# Patient Record
Sex: MALE | Race: WHITE | NOT HISPANIC OR LATINO | Employment: OTHER | URBAN - METROPOLITAN AREA
[De-identification: names, ages, dates, MRNs, and addresses within clinical notes are randomized per-mention and may not be internally consistent; named-entity substitution may affect disease eponyms.]

---

## 2017-03-08 DIAGNOSIS — E11.9 TYPE 2 DIABETES MELLITUS WITHOUT COMPLICATIONS (H): ICD-10-CM

## 2017-03-08 NOTE — TELEPHONE ENCOUNTER
metFORMIN (GLUCOPHAGE) 500 MG tablet         Last Written Prescription Date: 1/2/2017  Last Fill Quantity: 180, # refills: 0  [place] [facility] or [facility] prescribing provider:  9/29/2016        BP Readings from Last 3 Encounters:   09/29/16 138/72   05/26/16 145/66   04/21/16 127/66     Lab Results   Component Value Date    MICROL 6 03/18/2016     No results found for: MICROALBUMIN  Creatinine   Date Value Ref Range Status   09/29/2016 1.01 0.66 - 1.25 mg/dL Final   ]  GFR Estimate   Date Value Ref Range Status   09/29/2016 72 >60 mL/min/1.7m2 Final     Comment:     Non  GFR Calc   04/21/2016 66 >60 mL/min/1.7m2 Final     Comment:     Non  GFR Calc   03/18/2016 68 >60 mL/min/1.7m2 Final     Comment:     Non  GFR Calc     GFR Estimate If Black   Date Value Ref Range Status   09/29/2016 87 >60 mL/min/1.7m2 Final     Comment:      GFR Calc   04/21/2016 80 >60 mL/min/1.7m2 Final     Comment:      GFR Calc   03/18/2016 82 >60 mL/min/1.7m2 Final     Comment:      GFR Calc     Lab Results   Component Value Date    CHOL 146 03/18/2016     Lab Results   Component Value Date    HDL 42 03/18/2016     Lab Results   Component Value Date    LDL 85 03/18/2016     Lab Results   Component Value Date    TRIG 97 03/18/2016     Lab Results   Component Value Date    CHOLHDLRATIO 2.4 03/16/2015     Lab Results   Component Value Date    AST 16 04/21/2016     Lab Results   Component Value Date    ALT 31 04/21/2016     Lab Results   Component Value Date    A1C 5.9 09/29/2016    A1C 6.1 03/18/2016    A1C 6.2 09/28/2015    A1C 6.4 03/16/2015    A1C 6.0 10/06/2014     Potassium   Date Value Ref Range Status   09/29/2016 4.3 3.4 - 5.3 mmol/L Final

## 2017-03-09 NOTE — TELEPHONE ENCOUNTER
Due for CPE per last OV note  Spoke with patient.  Chhaya scheduled.  Request 30 days supply for coverage until appt time  30 days refilled to [facility] pharmacy as requested    Next 5 appointments (look out 90 days)     Mar 17, 2017  9:20 AM CDT   PHYSICAL with Hamilton Romo MD   [facility] ([facility]    [address] [place] [id number]   [phone]                Che Berg RN

## 2017-03-11 DIAGNOSIS — E11.9 TYPE 2 DIABETES MELLITUS WITHOUT COMPLICATIONS (H): ICD-10-CM

## 2017-03-11 DIAGNOSIS — E78.2 MIXED HYPERLIPIDEMIA: ICD-10-CM

## 2017-03-13 RX ORDER — PRAVASTATIN SODIUM 20 MG
TABLET ORAL
Qty: 15 TABLET | Refills: 0 | Status: SHIPPED | OUTPATIENT
Start: 2017-03-13 | End: 2017-03-17

## 2017-03-13 NOTE — TELEPHONE ENCOUNTER
pravastatin     Last Written Prescription Date: 10/4/16  Last Fill Quantity: 90, # refills: 1  Last Office Visit with [facility], [facility] or [facility] prescribing provider: 9/29/16  Next 5 appointments (look out 90 days)     Mar 17, 2017  9:20 AM CDT   PHYSICAL with Hamilton Romo MD   [facility]    [address] [place] [id number] [phone]                   Lab Results   Component Value Date    CHOL 146 03/18/2016     Lab Results   Component Value Date    HDL 42 03/18/2016     Lab Results   Component Value Date    LDL 85 03/18/2016     Lab Results   Component Value Date    TRIG 97 03/18/2016     Lab Results   Component Value Date    CHOLHDLRATIO 2.4 03/16/2015     Medication is being filled for 1 time refill only due to:  Patient needs to be seen because FLP every 12 months..     Stephy Dozier RN   [facility] - Triage

## 2017-03-16 ENCOUNTER — TRANSFERRED RECORDS (OUTPATIENT)
Dept: HEALTH INFORMATION MANAGEMENT | Facility: CLINIC | Age: 77
End: 2017-03-16

## 2017-03-17 ENCOUNTER — OFFICE VISIT (OUTPATIENT)
Dept: FAMILY MEDICINE | Facility: CLINIC | Age: 77
End: 2017-03-17
Payer: MEDICARE

## 2017-03-17 ENCOUNTER — MYC MEDICAL ADVICE (OUTPATIENT)
Dept: FAMILY MEDICINE | Facility: CLINIC | Age: 77
End: 2017-03-17

## 2017-03-17 VITALS
DIASTOLIC BLOOD PRESSURE: 64 MMHG | HEIGHT: 67 IN | BODY MASS INDEX: 34.06 KG/M2 | OXYGEN SATURATION: 97 % | HEART RATE: 68 BPM | TEMPERATURE: 97.5 F | SYSTOLIC BLOOD PRESSURE: 144 MMHG | WEIGHT: 217 LBS

## 2017-03-17 DIAGNOSIS — Z12.5 SCREENING FOR PROSTATE CANCER: ICD-10-CM

## 2017-03-17 DIAGNOSIS — R80.9 MICROALBUMINURIA: ICD-10-CM

## 2017-03-17 DIAGNOSIS — N40.0 BENIGN NON-NODULAR PROSTATIC HYPERPLASIA WITHOUT LOWER URINARY TRACT SYMPTOMS: ICD-10-CM

## 2017-03-17 DIAGNOSIS — E11.9 TYPE 2 DIABETES MELLITUS WITHOUT COMPLICATION, UNSPECIFIED LONG TERM INSULIN USE STATUS: ICD-10-CM

## 2017-03-17 DIAGNOSIS — E78.2 MIXED HYPERLIPIDEMIA: ICD-10-CM

## 2017-03-17 DIAGNOSIS — Z00.00 HEALTH MAINTENANCE EXAMINATION: Primary | ICD-10-CM

## 2017-03-17 LAB
ALT SERPL W P-5'-P-CCNC: 42 U/L (ref 0–70)
ANION GAP SERPL CALCULATED.3IONS-SCNC: 11 MMOL/L (ref 3–14)
BUN SERPL-MCNC: 27 MG/DL (ref 7–30)
CALCIUM SERPL-MCNC: 9.1 MG/DL (ref 8.5–10.1)
CHLORIDE SERPL-SCNC: 104 MMOL/L (ref 94–109)
CHOLEST SERPL-MCNC: 148 MG/DL
CO2 SERPL-SCNC: 23 MMOL/L (ref 20–32)
CREAT SERPL-MCNC: 1.07 MG/DL (ref 0.66–1.25)
CREAT UR-MCNC: 100 MG/DL
ERYTHROCYTE [DISTWIDTH] IN BLOOD BY AUTOMATED COUNT: 13.7 % (ref 10–15)
GFR SERPL CREATININE-BSD FRML MDRD: 67 ML/MIN/1.7M2
GLUCOSE SERPL-MCNC: 113 MG/DL (ref 70–99)
HBA1C MFR BLD: 6.3 % (ref 4.3–6)
HCT VFR BLD AUTO: 40.2 % (ref 40–53)
HDLC SERPL-MCNC: 42 MG/DL
HGB BLD-MCNC: 13.3 G/DL (ref 13.3–17.7)
LDLC SERPL CALC-MCNC: 78 MG/DL
MCH RBC QN AUTO: 29 PG (ref 26.5–33)
MCHC RBC AUTO-ENTMCNC: 33.1 G/DL (ref 31.5–36.5)
MCV RBC AUTO: 88 FL (ref 78–100)
MICROALBUMIN UR-MCNC: 22 MG/L
MICROALBUMIN/CREAT UR: 21.8 MG/G CR (ref 0–17)
NONHDLC SERPL-MCNC: 106 MG/DL
PLATELET # BLD AUTO: 203 10E9/L (ref 150–450)
POTASSIUM SERPL-SCNC: 4.5 MMOL/L (ref 3.4–5.3)
PSA SERPL-ACNC: 0.7 UG/L (ref 0–4)
RBC # BLD AUTO: 4.58 10E12/L (ref 4.4–5.9)
SODIUM SERPL-SCNC: 138 MMOL/L (ref 133–144)
TRIGL SERPL-MCNC: 141 MG/DL
WBC # BLD AUTO: 6.5 10E9/L (ref 4–11)

## 2017-03-17 PROCEDURE — 82043 UR ALBUMIN QUANTITATIVE: CPT | Performed by: FAMILY MEDICINE

## 2017-03-17 PROCEDURE — G0103 PSA SCREENING: HCPCS | Performed by: FAMILY MEDICINE

## 2017-03-17 PROCEDURE — 84460 ALANINE AMINO (ALT) (SGPT): CPT | Performed by: FAMILY MEDICINE

## 2017-03-17 PROCEDURE — 80061 LIPID PANEL: CPT | Performed by: FAMILY MEDICINE

## 2017-03-17 PROCEDURE — 36415 COLL VENOUS BLD VENIPUNCTURE: CPT | Performed by: FAMILY MEDICINE

## 2017-03-17 PROCEDURE — 83036 HEMOGLOBIN GLYCOSYLATED A1C: CPT | Performed by: FAMILY MEDICINE

## 2017-03-17 PROCEDURE — 85027 COMPLETE CBC AUTOMATED: CPT | Performed by: FAMILY MEDICINE

## 2017-03-17 PROCEDURE — G0438 PPPS, INITIAL VISIT: HCPCS | Performed by: FAMILY MEDICINE

## 2017-03-17 PROCEDURE — 80048 BASIC METABOLIC PNL TOTAL CA: CPT | Performed by: FAMILY MEDICINE

## 2017-03-17 RX ORDER — TERAZOSIN 5 MG/1
CAPSULE ORAL
Qty: 90 CAPSULE | Refills: 1 | Status: SHIPPED | OUTPATIENT
Start: 2017-03-17 | End: 2017-10-11

## 2017-03-17 RX ORDER — PRAVASTATIN SODIUM 20 MG
TABLET ORAL
Qty: 90 TABLET | Refills: 1 | Status: SHIPPED | OUTPATIENT
Start: 2017-03-17 | End: 2017-10-11

## 2017-03-17 RX ORDER — LISINOPRIL 5 MG/1
5 TABLET ORAL DAILY
Qty: 90 TABLET | Refills: 1 | Status: SHIPPED | OUTPATIENT
Start: 2017-03-17 | End: 2017-10-11

## 2017-03-17 NOTE — NURSING NOTE
"Chief Complaint   Patient presents with     Physical     is fasting       Initial /64  Pulse 68  Temp 97.5  F (36.4  C) (Tympanic)  Ht 5' 7\" (1.702 m)  Wt 217 lb (98.4 kg)  SpO2 97%  BMI 33.99 kg/m2 Estimated body mass index is 33.99 kg/(m^2) as calculated from the following:    Height as of this encounter: 5' 7\" (1.702 m).    Weight as of this encounter: 217 lb (98.4 kg).  Medication Reconciliation: complete     Suzie Lomax CMA      "

## 2017-03-17 NOTE — PROGRESS NOTES
SUBJECTIVE:                                                            Vikash Arredondo II is a 76 year old male who presents for Preventive Visit.      Are you in the first 12 months of your Medicare Part B coverage?  No    Healthy Habits:    Do you get at least three servings of calcium containing foods daily (dairy, green leafy vegetables, etc.)? yes    Amount of exercise or daily activities, outside of work: 2-3 day(s) per week    Problems taking medications regularly No    Medication side effects: No    Have you had an eye exam in the past two years? yes    Do you see a dentist twice per year? yes    Do you have sleep apnea, excessive snoring or daytime drowsiness?no    COGNITIVE SCREEN  1) Repeat 3 items (Banana, Sunrise, Chair)    2) Clock draw: NORMAL  3) 3 item recall: Recalls 3 objects  Results: NORMAL clock, 1-2 items recalled: COGNITIVE IMPAIRMENT LESS LIKELY    Mini-CogTM Copyright S Minna. Licensed by the author for use in [facility]; reprinted with permission (cooper@[facility]). All rights reserved.                Reviewed and updated as needed this visit by clinical staff  Tobacco  Allergies  Meds  Med Hx  Surg Hx  Fam Hx  Soc Hx        Reviewed and updated as needed this visit by Provider        Social History   Substance Use Topics     Smoking status: Former Smoker     Packs/day: 0.50     Years: 1.00     Types: Cigarettes     Quit date: 1/2/1961     Smokeless tobacco: Never Used     Alcohol use No       The patient does not drink >3 drinks per day nor >7 drinks per week.    Today's PHQ-2 Score:   PHQ-2 ( 1999 [employer]) 3/17/2017 9/29/2016   Q1: Little interest or pleasure in doing things 0 0   Q2: Feeling down, depressed or hopeless 0 0   PHQ-2 Score 0 0   Little interest or pleasure in doing things - -   Feeling down, depressed or hopeless - -   PHQ-2 Score - -       Do you feel safe in your environment - Yes    Do you have a Health Care Directive?: Yes: Advance Directive has  been received and scanned.    Current providers sharing in care for this patient include:   Patient Care Team:  Hamilton Romo MD as PCP - General (Family Practice)      Hearing impairment: No    Ability to successfully perform activities of daily living: Yes, no assistance needed     Fall risk:  Fallen 2 or more times in the past year?: No  Any fall with injury in the past year?: No    Home safety:  none identified      The following health maintenance items are reviewed in Epic and correct as of today:  Health Maintenance   Topic Date Due     MEDICARE ANNUAL WELLNESS VISIT  11/24/2005     FOOT EXAM Q1 YEAR( NO INBASKET)  03/13/2015     TSH W/ FREE T4 REFLEX Q2 YEAR (NO INBASKET)  03/18/2015     ADVANCE DIRECTIVE PLANNING Q5 YRS (NO INBASKET)  11/10/2016     LIPID MONITORING Q1 YEAR( NO INBASKET)  03/18/2017     MICROALBUMIN Q1 YEAR( NO INBASKET)  03/18/2017     EYE EXAM Q1 YEAR( NO INBASKET)  03/19/2017     A1C Q6 MO( NO INBASKET)  03/29/2017     INFLUENZA VACCINE (SYSTEM ASSIGNED)  09/01/2017     CREATININE Q1 YEAR (NO INBASKET)  09/29/2017     FALL RISK ASSESSMENT  09/29/2017     TETANUS IMMUNIZATION (SYSTEM ASSIGNED)  09/11/2022     COLONOSCOPY Q10 YR INBASKET MESSAGE  05/19/2026     PNEUMOCOCCAL  Completed         Pneumonia Vaccine:Adults age 65+ who received Pneumovax (PPSV23) at 65 years or older: Should be given PCV13 > 1 year after their most recent PPSV23     ROS:  Constitutional, HEENT, cardiovascular, pulmonary, gi and gu systems are negative, except as otherwise noted.    BP Readings from Last 3 Encounters:   03/17/17 144/64   09/29/16 138/72   05/26/16 145/66    Wt Readings from Last 3 Encounters:   03/17/17 217 lb (98.4 kg)   09/29/16 209 lb (94.8 kg)   05/26/16 213 lb 6.4 oz (96.8 kg)                  Patient Active Problem List   Diagnosis     BPH (benign prostatic hyperplasia)     CARDIOVASCULAR SCREENING; LDL GOAL LESS THAN 100     Advanced directives, counseling/discussion     Obesity (BMI  30.0-34.9)     Type 2 diabetes mellitus without complications (H)     Normocytic normochromic anemia     Past Surgical History   Procedure Laterality Date     C laminotomy,lumbar disk,1 intrsp  1999     Hc repair of nasal septum  2002     Hc vasectomy unilat/bilat w postop semen  1974     Vasectomy     Hc repair incisional hernia,reducible  1986     Hernia Repair, Incisional, Unilateral     Colonoscopy  2/2002     Normal     Colonoscopy  8/11/07     Diverticulosis; repeat in 10 years     Hc repair rotator cuff,acute  8/2008     left shoulder     Extracapsular cataract extration with intraocular lens implant  7/2010     right eye     Extracapsular cataract extration with intraocular lens implant  11/2010     left eye     Laparoscopic herniorrhaphy ventral  1/23/2013     Procedure: LAPAROSCOPIC HERNIORRHAPHY VENTRAL;  LAPAROSCOPIC VENTRAL  RIGHT HERNIA REPAIR WITH MESH;  Surgeon: Jeanmarie Hutchison MD;  [facility]       Social History   Substance Use Topics     Smoking status: Former Smoker     Packs/day: 0.50     Years: 1.00     Types: Cigarettes     Quit date: 1/2/1961     Smokeless tobacco: Never Used     Alcohol use No     Family History   Problem Relation Age of Onset     Cancer - colorectal Paternal Uncle      C.A.D. Paternal Uncle      Anesthesia Reaction No family hx of      Blood Disease No family hx of      Thyroid Disease No family hx of      Eye Disorder No family hx of      Hypertension No family hx of          Current Outpatient Prescriptions   Medication Sig Dispense Refill     metFORMIN (GLUCOPHAGE) 500 MG tablet Take 1 tablet (500 mg) by mouth 3 times daily (with meals) . Need appointment for further refill 270 tablet 1     pravastatin (PRAVACHOL) 20 MG tablet TAKE 0.5 TABLET BY MOUTH DAILY 90 tablet 1     terazosin (HYTRIN) 5 MG capsule TAKE ONE CAPSULE BY MOUTH NIGHTLY AT BEDTIME 90 capsule 1     lisinopril (PRINIVIL/ZESTRIL) 5 MG tablet Take 1 tablet (5 mg) by mouth daily 90 tablet 1      "Calcium Carbonate-Vitamin D (CALCIUM + D PO) Take  by mouth.       ASPIRIN 81 MG OR TABS 1 tab po QD (Once per day)       MULTIVITAMIN TABS   OR 1 DAILY       [DISCONTINUED] pravastatin (PRAVACHOL) 20 MG tablet TAKE 1/2 TABLET (10 MG) BY MOUTH DAILY (Patient not taking: Reported on 3/17/2017) 15 tablet 0     [DISCONTINUED] metFORMIN (GLUCOPHAGE) 500 MG tablet Take 1 tablet (500 mg) by mouth 2 times daily (with meals) . Need appointment for further refill 60 tablet 0     [DISCONTINUED] pravastatin (PRAVACHOL) 20 MG tablet TAKE 0.5 TABLET BY MOUTH DAILY 90 tablet 1     [DISCONTINUED] terazosin (HYTRIN) 5 MG capsule TAKE ONE CAPSULE BY MOUTH NIGHTLY AT BEDTIME 90 capsule 1     [DISCONTINUED] lisinopril (PRINIVIL,ZESTRIL) 5 MG tablet Take 1 tablet (5 mg) by mouth daily 90 tablet 1     Cholecalciferol (VITAMIN D3) 3000 UNIT TABS Take  by mouth.       Allergies   Allergen Reactions     No Known Drug Allergies      Recent Labs   Lab Test  09/29/16   0939  04/21/16   1107  03/18/16   1004  09/28/15   0858  03/16/15   1112  10/06/14   0833   03/18/13   1035   03/10/11   1209   A1C  5.9   --   6.1*  6.2*  6.4*  6.0   < >  5.5   < >  5.7   LDL   --    --   85   --   52  67   < >  98   < >  87   HDL   --    --   42   --   48  36*   < >  40   < >  31*   TRIG   --    --   97   --   85  208*   < >  91   < >  111   ALT   --   31  32   --   41   --    --   28   < >  26   CR  1.01  1.09  1.06  1.09  1.04  1.12   < >  1.07   < >  1.03   GFRESTIMATED  72  66  68  66  70  64   < >  68   < >  71   GFRESTBLACK  87  80  82  80  84  77   < >  82   < >  86   POTASSIUM  4.3  4.2  4.4  4.2  3.9  4.7   --   4.8   < >  4.3   TSH   --    --    --    --    --    --    --   1.73   --   1.38    < > = values in this interval not displayed.      OBJECTIVE:                                                            /64  Pulse 68  Temp 97.5  F (36.4  C) (Tympanic)  Ht 5' 7\" (1.702 m)  Wt 217 lb (98.4 kg)  SpO2 97%  BMI 33.99 kg/m2 Estimated body " "mass index is 33.99 kg/(m^2) as calculated from the following:    Height as of this encounter: 5' 7\" (1.702 m).    Weight as of this encounter: 217 lb (98.4 kg).  EXAM:   GENERAL: healthy, alert and no distress  EYES: Eyes grossly normal to inspection, PERRL and conjunctivae and sclerae normal  HENT: ear canals and TM's normal, nose and mouth without ulcers or lesions  NECK: no adenopathy, no asymmetry, masses, or scars and thyroid normal to palpation  RESP: lungs clear to auscultation - no rales, rhonchi or wheezes  CV: regular rate and rhythm, normal S1 S2, no S3 or S4, no murmur, click or rub, no peripheral edema and peripheral pulses strong  ABDOMEN: soft, nontender, no hepatosplenomegaly, no masses and bowel sounds normal   (male): normal male genitalia without lesions or urethral discharge, no hernia  MS: no gross musculoskeletal defects noted, no edema  SKIN: no suspicious lesions or rashes  NEURO: Normal strength and tone, mentation intact and speech normal  BACK: no CVA tenderness, no paralumbar tenderness  PSYCH: mentation appears normal, affect normal/bright  LYMPH: no cervical, supraclavicular, axillary, or inguinal adenopathy    ASSESSMENT / PLAN:                                                            1. Health maintenance examination    - CBC with platelets  - Basic metabolic panel  (Ca, Cl, CO2, Creat, Gluc, K, Na, BUN)  - ALT  - Lipid Profile with reflex to direct LDL  - Hemoglobin A1c  - Albumin Random Urine Quantitative  - PSA, screen    2. Screening for prostate cancer    - PSA, screen    3. Type 2 diabetes mellitus without complication, unspecified long term insulin use status (H)    - Basic metabolic panel  (Ca, Cl, CO2, Creat, Gluc, K, Na, BUN)  - ALT  - Lipid Profile with reflex to direct LDL  - Hemoglobin A1c  - Albumin Random Urine Quantitative  - metFORMIN (GLUCOPHAGE) 500 MG tablet; Take 1 tablet (500 mg) by mouth 3 times daily (with meals) . Need appointment for further refill  " "Dispense: 270 tablet; Refill: 1  - pravastatin (PRAVACHOL) 20 MG tablet; TAKE 0.5 TABLET BY MOUTH DAILY  Dispense: 90 tablet; Refill: 1    4. Mixed hyperlipidemia    - pravastatin (PRAVACHOL) 20 MG tablet; TAKE 0.5 TABLET BY MOUTH DAILY  Dispense: 90 tablet; Refill: 1    5. Benign non-nodular prostatic hyperplasia without lower urinary tract symptoms    - terazosin (HYTRIN) 5 MG capsule; TAKE ONE CAPSULE BY MOUTH NIGHTLY AT BEDTIME  Dispense: 90 capsule; Refill: 1    6. Microalbuminuria    - lisinopril (PRINIVIL/ZESTRIL) 5 MG tablet; Take 1 tablet (5 mg) by mouth daily  Dispense: 90 tablet; Refill: 1    End of Life Planning:  Patient currently has an advanced directive: Yes.  Practitioner is supportive of decision.    COUNSELING:  Reviewed preventive health counseling, as reflected in patient instructions        Estimated body mass index is 33.99 kg/(m^2) as calculated from the following:    Height as of this encounter: 5' 7\" (1.702 m).    Weight as of this encounter: 217 lb (98.4 kg).     reports that he quit smoking about 56 years ago. His smoking use included Cigarettes. He has a 0.50 pack-year smoking history. He has never used smokeless tobacco.      Appropriate preventive services were discussed with this patient, including applicable screening as appropriate for cardiovascular disease, diabetes, osteopenia/osteoporosis, and glaucoma.  As appropriate for age/gender, discussed screening for colorectal cancer, prostate cancer, breast cancer, and cervical cancer. Checklist reviewing preventive services available has been given to the patient.    Reviewed patients plan of care and provided an AVS. The Basic Care Plan (routine screening as documented in Health Maintenance) for Vikash meets the Care Plan requirement. This Care Plan has been established and reviewed with the Patient.    Counseling Resources:  ATP IV Guidelines  Pooled Cohorts Equation Calculator  Breast Cancer Risk Calculator  FRAX Risk " Assessment  ICSI Preventive Guidelines  Dietary Guidelines for Americans, 2010  USDA's MyPlate  ASA Prophylaxis  Lung CA Screening    Hamilton Romo MD  [facility] [place]

## 2017-03-17 NOTE — MR AVS SNAPSHOT
After Visit Summary   3/17/2017    Vikash Arredondo II    MRN: [id number]           Patient Information     Date Of Birth          1940        Visit Information        Provider Department      3/17/2017 9:20 AM Hamilton Romo MD [facility]        Today's Diagnoses     Health maintenance examination    -  1    Screening for prostate cancer        Type 2 diabetes mellitus without complication, unspecified long term insulin use status (H)        Mixed hyperlipidemia        Benign non-nodular prostatic hyperplasia without lower urinary tract symptoms        Microalbuminuria          Care Instructions      Preventive Health Recommendations:   Male Ages 65 and over    Yearly exam:             See your health care provider every year in order to  o   Review health changes.   o   Discuss preventive care.    o   Review your medicines if your doctor has prescribed any.    Talk with your health care provider about whether you should have a test to screen for prostate cancer (PSA).    Every 3 years, have a diabetes test (fasting glucose). If you are at risk for diabetes, you should have this test more often.    Every 5 years, have a cholesterol test. Have this test more often if you are at risk for high cholesterol or heart disease.     Every 10 years, have a colonoscopy. Or, have a yearly FIT test (stool test). These exams will check for colon cancer.    Talk to with your health care provider about screening for Abdominal Aortic Aneurysm if you have a family history of AAA or have a history of smoking.    Shots:     Get a flu shot each year.     Get a tetanus shot every 10 years.     Talk to your doctor about your pneumonia vaccines. There are now two you should receive - Pneumovax (PPSV 23) and Prevnar (PCV 13).     Talk to your doctor about a shingles vaccine.     Talk to your doctor about the hepatitis B vaccine.  Nutrition:     Eat at least 5 servings of fruits and vegetables each  day.     Eat whole-grain bread, whole-wheat pasta and brown rice instead of white grains and rice.     Talk to your provider about Calcium and Vitamin D.   Lifestyle    Exercise for at least 150 minutes a week (30 minutes a day, 5 days a week). This will help you control your weight and prevent disease.     Limit alcohol to one drink per day.     No smoking.     Wear sunscreen to prevent skin cancer.     See your dentist every six months for an exam and cleaning.     See your eye doctor every 1 to 2 years to screen for conditions such as glaucoma, macular degeneration, cataracts, etc   Preventive Health Recommendations:   Male Ages 65 and over    Yearly exam:             See your health care provider every year in order to  o   Review health changes.   o   Discuss preventive care.    o   Review your medicines if your doctor has prescribed any.  Talk with your health care provider about whether you should have a test to screen for prostate cancer (PSA).  Every 3 years, have a diabetes test (fasting glucose). If you are at risk for diabetes, you should have this test more often.  Every 5 years, have a cholesterol test. Have this test more often if you are at risk for high cholesterol or heart disease.   Every 10 years, have a colonoscopy. Or, have a yearly FIT test (stool test). These exams will check for colon cancer.  Talk to with your health care provider about screening for Abdominal Aortic Aneurysm if you have a family history of AAA or have a history of smoking.    Shots:   Get a flu shot each year.   Get a tetanus shot every 10 years.   Talk to your doctor about your pneumonia vaccines. There are now two you should receive - Pneumovax (PPSV 23) and Prevnar (PCV 13).   Talk to your doctor about a shingles vaccine.   Talk to your doctor about the hepatitis B vaccine.  Nutrition:   Eat at least 5 servings of fruits and vegetables each day.   Eat whole-grain bread, whole-wheat pasta and brown rice instead of white  grains and rice.   Talk to your provider about Calcium and Vitamin D.   Lifestyle  Exercise for at least 150 minutes a week (30 minutes a day, 5 days a week). This will help you control your weight and prevent disease.   Limit alcohol to one drink per day.   No smoking.   Wear sunscreen to prevent skin cancer.   See your dentist every six months for an exam and cleaning.   See your eye doctor every 1 to 2 years to screen for conditions such as glaucoma, macular degeneration, cataracts, etc         Follow-ups after your visit        Who to contact     If you have questions or need follow up information about today's clinic visit or your schedule please contact [facility] [place] directly at [phone].  Normal or non-critical lab and imaging results will be communicated to you by BlackStratushart, letter or phone within 4 business days after the clinic has received the results. If you do not hear from us within 7 days, please contact the clinic through BlackStratushart or phone. If you have a critical or abnormal lab result, we will notify you by phone as soon as possible.  Submit refill requests through VIPerks or call your pharmacy and they will forward the refill request to us. Please allow 3 business days for your refill to be completed.          Additional Information About Your Visit        VIPerks Information     VIPerks gives you secure access to your electronic health record. If you see a primary care provider, you can also send messages to your care team and make appointments. If you have questions, please call your primary care clinic.  If you do not have a primary care provider, please call [phone] and they will assist you.        Care EveryWhere ID     This is your Care EveryWhere ID. This could be used by other organizations to access your [facility] medical records  [id number]        Your Vitals Were     Pulse Temperature Height Pulse Oximetry BMI (Body Mass Index)       68 97.5  F (36.4  C) (Tympanic)  "5' 7\" (1.702 m) 97% 33.99 kg/m2        Blood Pressure from Last 3 Encounters:   03/17/17 144/64   09/29/16 138/72   05/26/16 145/66    Weight from Last 3 Encounters:   03/17/17 217 lb (98.4 kg)   09/29/16 209 lb (94.8 kg)   05/26/16 213 lb 6.4 oz (96.8 kg)              We Performed the Following     Albumin Random Urine Quantitative     ALT     Basic metabolic panel  (Ca, Cl, CO2, Creat, Gluc, K, Na, BUN)     CBC with platelets     Hemoglobin A1c     Lipid Profile with reflex to direct LDL     PSA, screen          Today's Medication Changes          These changes are accurate as of: 3/17/17  9:45 AM.  If you have any questions, ask your nurse or doctor.               These medicines have changed or have updated prescriptions.        Dose/Directions    metFORMIN 500 MG tablet   Commonly known as:  GLUCOPHAGE   This may have changed:  when to take this   Used for:  Type 2 diabetes mellitus without complication, unspecified long term insulin use status (H)   Changed by:  Hamilton Romo MD        Dose:  500 mg   Take 1 tablet (500 mg) by mouth 3 times daily (with meals) . Need appointment for further refill   Quantity:  270 tablet   Refills:  1       pravastatin 20 MG tablet   Commonly known as:  PRAVACHOL   This may have changed:  See the new instructions.   Used for:  Type 2 diabetes mellitus without complication, unspecified long term insulin use status (H), Mixed hyperlipidemia   Changed by:  Hamilton Romo MD        TAKE 0.5 TABLET BY MOUTH DAILY   Quantity:  90 tablet   Refills:  1            Where to get your medicines      These medications were sent to [place] [id number] [place] - [address]  [address] [facility] [place] [address]     Phone:  [phone]     lisinopril 5 MG tablet    metFORMIN 500 MG tablet    pravastatin 20 MG tablet    terazosin 5 MG capsule                Primary Care Provider Office Phone # Fax #    Hamilton Romo [phone]        [place] " [facility] [address] [facility] [address]        Thank you!     Thank you for choosing [facility]  for your care. Our goal is always to provide you with excellent care. Hearing back from our patients is one way we can continue to improve our services. Please take a few minutes to complete the written survey that you may receive in the mail after your visit with us. Thank you!             Your Updated Medication List - Protect others around you: Learn how to safely use, store and throw away your medicines at www.disposemymeds.org.          This list is accurate as of: 3/17/17  9:45 AM.  Always use your most recent med list.                   Brand Name Dispense Instructions for use    aspirin 81 MG tablet      1 tab po QD (Once per day)       CALCIUM + D PO      Take  by mouth.       lisinopril 5 MG tablet    PRINIVIL/ZESTRIL    90 tablet    Take 1 tablet (5 mg) by mouth daily       metFORMIN 500 MG tablet    GLUCOPHAGE    270 tablet    Take 1 tablet (500 mg) by mouth 3 times daily (with meals) . Need appointment for further refill       MULTIVITAMIN TABS   OR      1 DAILY       pravastatin 20 MG tablet    PRAVACHOL    90 tablet    TAKE 0.5 TABLET BY MOUTH DAILY       terazosin 5 MG capsule    HYTRIN    90 capsule    TAKE ONE CAPSULE BY MOUTH NIGHTLY AT BEDTIME       Vitamin D3 3000 UNITS Tabs      Take  by mouth.

## 2017-03-17 NOTE — LETTER
[address] [address]  Phone: [phone]    March 17, 2017      Harsha Berger M.D.  [facility]  [address] [place], [place]           Reg: Vikash Arredondo II  [address]   [facility] [address]           To Dr Berger,     RE: Vikash Arredondo is followed at our clinic for diabetes. He has had good control with current regimen, and diabetic status is stable. The patient has slightly decreased sensation on the toes of both feet as was last time, but it hasn't been deteriorating. All the recent lab results will be faxed on your request as needed base.  He has no cerebrovascular, cardiovascular, or peripheral vascular complications. His medication list is attached. In short, he has maintained good control by diet and and exercise with the addition of a small dose of metformin.     Please contact me for questions or concerns.        Hamilton Romo MD

## 2017-03-20 NOTE — TELEPHONE ENCOUNTER
A1C faxed to Dr Berger's office at the number below. My Chart message sent to the pt.  Dali Hartman,

## 2017-04-10 DIAGNOSIS — E78.2 MIXED HYPERLIPIDEMIA: ICD-10-CM

## 2017-04-10 DIAGNOSIS — E11.9 TYPE 2 DIABETES MELLITUS WITHOUT COMPLICATIONS (H): ICD-10-CM

## 2017-04-10 NOTE — TELEPHONE ENCOUNTER
Pravachol      Last Written Prescription Date: 3/17/17  Last Fill Quantity: 90, # refills: 2  Last Office Visit with [facility] or [facility] prescribing provider: 3/17/17       Lab Results   Component Value Date    CHOL 148 03/17/2017     Lab Results   Component Value Date    HDL 42 03/17/2017     Lab Results   Component Value Date    LDL 78 03/17/2017     Lab Results   Component Value Date    TRIG 141 03/17/2017     Lab Results   Component Value Date    CHOLHDLRATIO 2.4 03/16/2015

## 2017-04-11 RX ORDER — PRAVASTATIN SODIUM 20 MG
TABLET ORAL
Qty: 15 TABLET | Refills: 0 | OUTPATIENT
Start: 2017-04-11

## 2017-07-13 ENCOUNTER — OFFICE VISIT (OUTPATIENT)
Dept: FAMILY MEDICINE | Facility: CLINIC | Age: 77
End: 2017-07-13
Payer: MEDICARE

## 2017-07-13 VITALS
HEART RATE: 93 BPM | WEIGHT: 219 LBS | BODY MASS INDEX: 34.37 KG/M2 | RESPIRATION RATE: 18 BRPM | SYSTOLIC BLOOD PRESSURE: 116 MMHG | DIASTOLIC BLOOD PRESSURE: 68 MMHG | TEMPERATURE: 97.5 F | HEIGHT: 67 IN | OXYGEN SATURATION: 98 %

## 2017-07-13 DIAGNOSIS — E11.9 TYPE 2 DIABETES MELLITUS WITHOUT COMPLICATION, UNSPECIFIED LONG TERM INSULIN USE STATUS: ICD-10-CM

## 2017-07-13 DIAGNOSIS — R80.9 MICROALBUMINURIA: Primary | ICD-10-CM

## 2017-07-13 DIAGNOSIS — Z13.89 SCREENING FOR DIABETIC PERIPHERAL NEUROPATHY: ICD-10-CM

## 2017-07-13 PROCEDURE — 82043 UR ALBUMIN QUANTITATIVE: CPT | Performed by: FAMILY MEDICINE

## 2017-07-13 PROCEDURE — 99214 OFFICE O/P EST MOD 30 MIN: CPT | Performed by: FAMILY MEDICINE

## 2017-07-13 NOTE — NURSING NOTE
"Chief Complaint   Patient presents with     Lettter Request       Initial /68 (Cuff Size: Adult Large)  Pulse 93  Temp 97.5  F (36.4  C) (Tympanic)  Resp 18  Ht 5' 7\" (1.702 m)  Wt 219 lb (99.3 kg)  SpO2 98%  BMI 34.3 kg/m2 Estimated body mass index is 34.3 kg/(m^2) as calculated from the following:    Height as of this encounter: 5' 7\" (1.702 m).    Weight as of this encounter: 219 lb (99.3 kg).  Medication Reconciliation: complete   Mary Hussein, CMA    "

## 2017-07-13 NOTE — PROGRESS NOTES
SUBJECTIVE:                                                    Vikash Arredondo II is a 76 year old male who presents to clinic today for the following health issues:      Letter Request         Description (location/character/radiation): Pt states that he needs a letter for [employer].         Problem list and histories reviewed & adjusted, as indicated.  Additional history: as documented    Patient Active Problem List   Diagnosis     BPH (benign prostatic hyperplasia)     CARDIOVASCULAR SCREENING; LDL GOAL LESS THAN 100     Advanced directives, counseling/discussion     Obesity (BMI 30.0-34.9)     Type 2 diabetes mellitus without complications (H)     Normocytic normochromic anemia     Past Surgical History:   Procedure Laterality Date     C LAMINOTOMY,LUMBAR DISK,1 INTRSP  1999     COLONOSCOPY  2/2002    Normal     COLONOSCOPY  8/11/07    Diverticulosis; repeat in 10 years     EXTRACAPSULAR CATARACT EXTRATION WITH INTRAOCULAR LENS IMPLANT  7/2010    right eye     EXTRACAPSULAR CATARACT EXTRATION WITH INTRAOCULAR LENS IMPLANT  11/2010    left eye     HC REPAIR INCISIONAL HERNIA,REDUCIBLE  1986    Hernia Repair, Incisional, Unilateral     HC REPAIR OF NASAL SEPTUM  2002     HC REPAIR ROTATOR CUFF,ACUTE  8/2008    left shoulder     HC VASECTOMY UNILAT/BILAT W POSTOP SEMEN  1974    Vasectomy     LAPAROSCOPIC HERNIORRHAPHY VENTRAL  1/23/2013    Procedure: LAPAROSCOPIC HERNIORRHAPHY VENTRAL;  LAPAROSCOPIC VENTRAL  RIGHT HERNIA REPAIR WITH MESH;  Surgeon: Jeanmarie Hutchison MD;  Location: [facility]       Social History   Substance Use Topics     Smoking status: Former Smoker     Packs/day: 0.50     Years: 1.00     Types: Cigarettes     Quit date: 1/2/1961     Smokeless tobacco: Never Used     Alcohol use No     Family History   Problem Relation Age of Onset     Cancer - colorectal Paternal Uncle      C.A.D. Paternal Uncle      Anesthesia Reaction No family hx of      Blood Disease No family hx of      Thyroid Disease No  family hx of      Eye Disorder No family hx of      Hypertension No family hx of          Current Outpatient Prescriptions   Medication Sig Dispense Refill     metFORMIN (GLUCOPHAGE) 500 MG tablet Take 1 tablet (500 mg) by mouth 3 times daily (with meals) . Need appointment for further refill 270 tablet 1     pravastatin (PRAVACHOL) 20 MG tablet TAKE 0.5 TABLET BY MOUTH DAILY 90 tablet 1     terazosin (HYTRIN) 5 MG capsule TAKE ONE CAPSULE BY MOUTH NIGHTLY AT BEDTIME 90 capsule 1     lisinopril (PRINIVIL/ZESTRIL) 5 MG tablet Take 1 tablet (5 mg) by mouth daily 90 tablet 1     Calcium Carbonate-Vitamin D (CALCIUM + D PO) Take  by mouth.       Cholecalciferol (VITAMIN D3) 3000 UNIT TABS Take  by mouth.       ASPIRIN 81 MG OR TABS 1 tab po QD (Once per day)       MULTIVITAMIN TABS   OR 1 DAILY       Allergies   Allergen Reactions     No Known Drug Allergies      Recent Labs   Lab Test  03/17/17   0942  09/29/16   0939  04/21/16   1107  03/18/16   1004   03/16/15   1112   03/18/13   1035   03/10/11   1209   A1C  6.3*  5.9   --   6.1*   < >  6.4*   < >  5.5   < >  5.7   LDL  78   --    --   85   --   52   < >  98   < >  87   HDL  42   --    --   42   --   48   < >  40   < >  31*   TRIG  141   --    --   97   --   85   < >  91   < >  111   ALT  42   --   31  32   --   41   --   28   < >  26   CR  1.07  1.01  1.09  1.06   < >  1.04   < >  1.07   < >  1.03   GFRESTIMATED  67  72  66  68   < >  70   < >  68   < >  71   GFRESTBLACK  81  87  80  82   < >  84   < >  82   < >  86   POTASSIUM  4.5  4.3  4.2  4.4   < >  3.9   < >  4.8   < >  4.3   TSH   --    --    --    --    --    --    --   1.73   --   1.38    < > = values in this interval not displayed.      BP Readings from Last 3 Encounters:   07/13/17 116/68   03/17/17 144/64   09/29/16 138/72    Wt Readings from Last 3 Encounters:   07/13/17 219 lb (99.3 kg)   03/17/17 217 lb (98.4 kg)   09/29/16 209 lb (94.8 kg)                    Reviewed and updated as needed this visit  "by clinical staff       Reviewed and updated as needed this visit by Provider         ROS:  Constitutional, HEENT, cardiovascular, pulmonary, gi and gu systems are negative, except as otherwise noted.    OBJECTIVE:     /68 (Cuff Size: Adult Large)  Pulse 93  Temp 97.5  F (36.4  C) (Tympanic)  Resp 18  Ht 5' 7\" (1.702 m)  Wt 219 lb (99.3 kg)  SpO2 98%  BMI 34.3 kg/m2  Body mass index is 34.3 kg/(m^2).  GENERAL: healthy, alert and no distress  NECK: no adenopathy, no asymmetry, masses, or scars and thyroid normal to palpation  RESP: lungs clear to auscultation - no rales, rhonchi or wheezes  CV: regular rate and rhythm, normal S1 S2, no S3 or S4, no murmur, click or rub, no peripheral edema and peripheral pulses strong  ABDOMEN: soft, nontender, no hepatosplenomegaly, no masses and bowel sounds normal  MS: no gross musculoskeletal defects noted, no edema        ASSESSMENT/PLAN:   Vikash was seen today for lettter request.    Diagnoses and all orders for this visit:    Microalbuminuria  -     Albumin Random Urine Quantitative    Screening for diabetic peripheral neuropathy  -     FOOT EXAM  NO CHARGE [[id number]]    Type 2 diabetes mellitus without complication, unspecified long term insulin use status (H)  -     Albumin Random Urine Quantitative    Other orders  -     Cancel: TSH WITH FREE T4 REFLEX      His BP has been stable, he is currently on lisinopril for renal protection related with DM, his DM has been controlled well as well.   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

## 2017-07-13 NOTE — MR AVS SNAPSHOT
After Visit Summary   7/13/2017    Vikash Arredondo II    MRN: [id number]           Patient Information     Date Of Birth          1940        Visit Information        Provider Department      7/13/2017 1:00 PM Hamilton Romo MD [facility]        Today's Diagnoses     Microalbuminuria    -  1    Screening for diabetic peripheral neuropathy        Type 2 diabetes mellitus without complication, unspecified long term insulin use status (H)           Follow-ups after your visit        Your next 10 appointments already scheduled     Jul 17, 2017  9:00 AM CDT   Nurse Only with EC MA/LPN   [facility] ([facility])    [address] [place] [address]   [phone]            Jul 20, 2017  9:00 AM CDT   Nurse Only with EC MA/LPN   [facility] ([facility])    [address] [place] [address]   [phone]              Who to contact     If you have questions or need follow up information about today's clinic visit or your schedule please contact [facility] directly at [phone].  Normal or non-critical lab and imaging results will be communicated to you by MyChart, letter or phone within 4 business days after the clinic has received the results. If you do not hear from us within 7 days, please contact the clinic through MedNet Solutionshart or phone. If you have a critical or abnormal lab result, we will notify you by phone as soon as possible.  Submit refill requests through Roomixer or call your pharmacy and they will forward the refill request to us. Please allow 3 business days for your refill to be completed.          Additional Information About Your Visit        MedNet Solutionshart Information     Roomixer gives you secure access to your electronic health record. If you see a primary care provider, you can also send messages to your care team and make appointments. If you  "have questions, please call your primary care clinic.  If you do not have a primary care provider, please call [phone] and they will assist you.        Care EveryWhere ID     This is your Care EveryWhere ID. This could be used by other organizations to access your [place] medical records  [id number]        Your Vitals Were     Pulse Temperature Respirations Height Pulse Oximetry BMI (Body Mass Index)    93 97.5  F (36.4  C) (Tympanic) 18 5' 7\" (1.702 m) 98% 34.3 kg/m2       Blood Pressure from Last 3 Encounters:   07/13/17 116/68   03/17/17 144/64   09/29/16 138/72    Weight from Last 3 Encounters:   07/13/17 219 lb (99.3 kg)   03/17/17 217 lb (98.4 kg)   09/29/16 209 lb (94.8 kg)              We Performed the Following     FOOT EXAM  NO CHARGE [[id number]]        Primary Care Provider Office Phone # Fax #    Hamilton Romo [phone]       [address] [address]        Equal Access to Services     CARLOS ORTEZ AH: Hadii aad ku hadasho Soomaali, waaxda luqadaha, qaybta kaalmada adeegyada, waxay purain hayedun simi case lajimenez ah. So [facility] [phone].    ATENCIÓN: Si habla español, tiene a shaver disposición servicios gratuitos de asistencia lingüística. Llame al [phone].    We comply with applicable federal civil rights laws and [place] laws. We do not discriminate on the basis of race, color, national origin, age, disability sex, sexual orientation or gender identity.            Thank you!     Thank you for choosing [facility]  for your care. Our goal is always to provide you with excellent care. Hearing back from our patients is one way we can continue to improve our services. Please take a few minutes to complete the written survey that you may receive in the mail after your visit with us. Thank you!             Your Updated Medication List - Protect others around you: Learn how to safely use, store and throw away your " medicines at www.disposemymeds.org.          This list is accurate as of: 7/13/17  1:27 PM.  Always use your most recent med list.                   Brand Name Dispense Instructions for use Diagnosis    aspirin 81 MG tablet      1 tab po QD (Once per day)    Type II or unspecified type diabetes mellitus without mention of complication, not stated as uncontrolled, Other and unspecified hyperlipidemia       CALCIUM + D PO      Take  by mouth.        lisinopril 5 MG tablet    PRINIVIL/ZESTRIL    90 tablet    Take 1 tablet (5 mg) by mouth daily    Microalbuminuria       metFORMIN 500 MG tablet    GLUCOPHAGE    270 tablet    Take 1 tablet (500 mg) by mouth 3 times daily (with meals) . Need appointment for further refill    Type 2 diabetes mellitus without complication, unspecified long term insulin use status (H)       MULTIVITAMIN TABS   OR      1 DAILY        pravastatin 20 MG tablet    PRAVACHOL    90 tablet    TAKE 0.5 TABLET BY MOUTH DAILY    Type 2 diabetes mellitus without complication, unspecified long term insulin use status (H), Mixed hyperlipidemia       terazosin 5 MG capsule    HYTRIN    90 capsule    TAKE ONE CAPSULE BY MOUTH NIGHTLY AT BEDTIME    Benign non-nodular prostatic hyperplasia without lower urinary tract symptoms       Vitamin D3 3000 UNITS Tabs      Take  by mouth.

## 2017-07-14 LAB
CREAT UR-MCNC: 126 MG/DL
MICROALBUMIN UR-MCNC: 19 MG/L
MICROALBUMIN/CREAT UR: 15.16 MG/G CR (ref 0–17)

## 2017-07-17 ENCOUNTER — ALLIED HEALTH/NURSE VISIT (OUTPATIENT)
Dept: NURSING | Facility: CLINIC | Age: 77
End: 2017-07-17
Payer: MEDICARE

## 2017-07-17 VITALS — SYSTOLIC BLOOD PRESSURE: 122 MMHG | DIASTOLIC BLOOD PRESSURE: 74 MMHG

## 2017-07-17 DIAGNOSIS — Z01.30 BP CHECK: Primary | ICD-10-CM

## 2017-07-17 PROCEDURE — 99207 ZZC NO CHARGE NURSE ONLY: CPT

## 2017-07-17 NOTE — PROGRESS NOTES
Vikash Arredondo II is a 76 year old male who comes in today for a Blood Pressure check because of ongoing blood pressure monitoring.    *Document pulse and BP  *Use new set of vitals button for multiple readings.  *Use extended vitals for orthostatic    Vitals as recorded, a large cuff was used.    Patient is taking medication as prescribed  Patient is tolerating medications well.  Patient is monitoring Blood Pressure at home.  Average readings if yes are 135    Current complaints: none    Disposition: follow-up as indicated by MD/AP, results routed to MD/AP and patient reminded to call as needed    Suzie Lomax CMA

## 2017-07-17 NOTE — MR AVS SNAPSHOT
After Visit Summary   7/17/2017    Vikash Arredondo II    MRN: [id number]           Patient Information     Date Of Birth          1940        Visit Information        Provider Department      7/17/2017 9:00 AM EC MA/LPN [facility]        Today's Diagnoses     BP check    -  1       Follow-ups after your visit        Your next 10 appointments already scheduled     Jul 20, 2017  9:00 AM CDT   Nurse Only with EC MA/LPN   [facility] ([facility])    [address] [place] [id number] [phone]              Who to contact     If you have questions or need follow up information about today's clinic visit or your schedule please contact [facility] directly at [phone].  Normal or non-critical lab and imaging results will be communicated to you by REEL Qualifiedhart, letter or phone within 4 business days after the clinic has received the results. If you do not hear from us within 7 days, please contact the clinic through REEL Qualifiedhart or phone. If you have a critical or abnormal lab result, we will notify you by phone as soon as possible.  Submit refill requests through Ctrip or call your pharmacy and they will forward the refill request to us. Please allow 3 business days for your refill to be completed.          Additional Information About Your Visit        MyChart Information     Ctrip gives you secure access to your electronic health record. If you see a primary care provider, you can also send messages to your care team and make appointments. If you have questions, please call your primary care clinic.  If you do not have a primary care provider, please call [phone] and they will assist you.        Care EveryWhere ID     This is your Care EveryWhere ID. This could be used by other organizations to access your [place] medical records  [id number]         Blood Pressure from Last 3 Encounters:    07/17/17 122/74   07/13/17 116/68   03/17/17 144/64    Weight from Last 3 Encounters:   07/13/17 219 lb (99.3 kg)   03/17/17 217 lb (98.4 kg)   09/29/16 209 lb (94.8 kg)              Today, you had the following     No orders found for display       Primary Care Provider Office Phone # Fax #    [place] JIM Romo [phone]       [address] [address]        Equal Access to Services     [facility]: Hadii aad ku hadasho Soomaali, waaxda luqadaha, qaybta kaalmada adeegyada, tarik godwin . So [facility] [phone].    ATENCIÓN: Si habla español, tiene a shaver disposición servicios gratuitos de asistencia lingüística. Llame al [phone].    We comply with applicable federal civil rights laws and [place] laws. We do not discriminate on the basis of race, color, national origin, age, disability sex, sexual orientation or gender identity.            Thank you!     Thank you for choosing [facility]  for your care. Our goal is always to provide you with excellent care. Hearing back from our patients is one way we can continue to improve our services. Please take a few minutes to complete the written survey that you may receive in the mail after your visit with us. Thank you!             Your Updated Medication List - Protect others around you: Learn how to safely use, store and throw away your medicines at www.disposemymeds.org.          This list is accurate as of: 7/17/17  9:07 AM.  Always use your most recent med list.                   Brand Name Dispense Instructions for use Diagnosis    aspirin 81 MG tablet      1 tab po QD (Once per day)    Type II or unspecified type diabetes mellitus without mention of complication, not stated as uncontrolled, Other and unspecified hyperlipidemia       CALCIUM + D PO      Take  by mouth.        lisinopril 5 MG tablet    PRINIVIL/ZESTRIL    90 tablet    Take 1 tablet (5 mg)  by mouth daily    Microalbuminuria       metFORMIN 500 MG tablet    GLUCOPHAGE    270 tablet    Take 1 tablet (500 mg) by mouth 3 times daily (with meals) . Need appointment for further refill    Type 2 diabetes mellitus without complication, unspecified long term insulin use status (H)       MULTIVITAMIN TABS   OR      1 DAILY        pravastatin 20 MG tablet    PRAVACHOL    90 tablet    TAKE 0.5 TABLET BY MOUTH DAILY    Type 2 diabetes mellitus without complication, unspecified long term insulin use status (H), Mixed hyperlipidemia       terazosin 5 MG capsule    HYTRIN    90 capsule    TAKE ONE CAPSULE BY MOUTH NIGHTLY AT BEDTIME    Benign non-nodular prostatic hyperplasia without lower urinary tract symptoms       Vitamin D3 3000 UNITS Tabs      Take  by mouth.

## 2017-07-20 ENCOUNTER — ALLIED HEALTH/NURSE VISIT (OUTPATIENT)
Dept: NURSING | Facility: CLINIC | Age: 77
End: 2017-07-20
Payer: MEDICARE

## 2017-07-20 VITALS — SYSTOLIC BLOOD PRESSURE: 122 MMHG | DIASTOLIC BLOOD PRESSURE: 62 MMHG

## 2017-07-20 DIAGNOSIS — R03.0 ELEVATED BLOOD PRESSURE READING WITHOUT DIAGNOSIS OF HYPERTENSION: Primary | ICD-10-CM

## 2017-07-20 PROCEDURE — 99207 ZZC NO CHARGE NURSE ONLY: CPT

## 2017-07-20 NOTE — PROGRESS NOTES
Vikash Arredondo II is a 76 year old male who comes in today for a Blood Pressure check because of ongoing blood pressure monitoring.    *Document pulse and BP  *Use new set of vitals button for multiple readings.  *Use extended vitals for orthostatic    Vitals as recorded, a large cuff was used.    Patient is taking medication as prescribed  Patient is tolerating medications well.  Patient is monitoring Blood Pressure at home.  Average readings if yes are 129/116    Current complaints: none    Disposition: results routed to MD/AP

## 2017-07-20 NOTE — MR AVS SNAPSHOT
After Visit Summary   7/20/2017    Vikash Arredondo II    MRN: [id number]           Patient Information     Date Of Birth          1940        Visit Information        Provider Department      7/20/2017 9:00 AM NAYANA ALICIA/LPN [facility]        Today's Diagnoses     Elevated blood pressure reading without diagnosis of hypertension    -  1       Follow-ups after your visit        Who to contact     If you have questions or need follow up information about today's clinic visit or your schedule please contact [facility] [place] [facility] directly at [phone].  Normal or non-critical lab and imaging results will be communicated to you by AnySource Mediahart, letter or phone within 4 business days after the clinic has received the results. If you do not hear from us within 7 days, please contact the clinic through Omnigyt or phone. If you have a critical or abnormal lab result, we will notify you by phone as soon as possible.  Submit refill requests through LingoLive or call your pharmacy and they will forward the refill request to us. Please allow 3 business days for your refill to be completed.          Additional Information About Your Visit        MyChart Information     LingoLive gives you secure access to your electronic health record. If you see a primary care provider, you can also send messages to your care team and make appointments. If you have questions, please call your primary care clinic.  If you do not have a primary care provider, please call [phone] and they will assist you.        Care EveryWhere ID     This is your Care EveryWhere ID. This could be used by other organizations to access your [place] medical records  [id number]         Blood Pressure from Last 3 Encounters:   07/20/17 122/62   07/17/17 122/74   07/13/17 116/68    Weight from Last 3 Encounters:   07/13/17 219 lb (99.3 kg)   03/17/17 217 lb (98.4 kg)   09/29/16 209 lb (94.8 kg)              Today, you had  the following     No orders found for display       Primary Care Provider Office Phone # Fax #    [place] JIM Romo [phone]       [address] [address]        Equal Access to Services     [facility] : Hadii aad ku hadshirazo Soomaali, waaxda luqadaha, qaybta kaalmada adeegyada, waxay idiin hayaan adeemerald case lajimenez hinojosa. So [facility] [phone].    ATENCIÓN: Si habla español, tiene a shaver disposición servicios gratuitos de asistencia lingüística. Llame al [phone].    We comply with applicable federal civil rights laws and [place] laws. We do not discriminate on the basis of race, color, national origin, age, disability sex, sexual orientation or gender identity.            Thank you!     Thank you for choosing [facility]  for your care. Our goal is always to provide you with excellent care. Hearing back from our patients is one way we can continue to improve our services. Please take a few minutes to complete the written survey that you may receive in the mail after your visit with us. Thank you!             Your Updated Medication List - Protect others around you: Learn how to safely use, store and throw away your medicines at www.disposemymeds.org.          This list is accurate as of: 7/20/17  9:21 AM.  Always use your most recent med list.                   Brand Name Dispense Instructions for use Diagnosis    aspirin 81 MG tablet      1 tab po QD (Once per day)    Type II or unspecified type diabetes mellitus without mention of complication, not stated as uncontrolled, Other and unspecified hyperlipidemia       CALCIUM + D PO      Take  by mouth.        lisinopril 5 MG tablet    PRINIVIL/ZESTRIL    90 tablet    Take 1 tablet (5 mg) by mouth daily    Microalbuminuria       metFORMIN 500 MG tablet    GLUCOPHAGE    270 tablet    Take 1 tablet (500 mg) by mouth 3 times daily (with meals) . Need appointment for further refill    Type  2 diabetes mellitus without complication, unspecified long term insulin use status (H)       MULTIVITAMIN TABS   OR      1 DAILY        pravastatin 20 MG tablet    PRAVACHOL    90 tablet    TAKE 0.5 TABLET BY MOUTH DAILY    Type 2 diabetes mellitus without complication, unspecified long term insulin use status (H), Mixed hyperlipidemia       terazosin 5 MG capsule    HYTRIN    90 capsule    TAKE ONE CAPSULE BY MOUTH NIGHTLY AT BEDTIME    Benign non-nodular prostatic hyperplasia without lower urinary tract symptoms       Vitamin D3 3000 UNITS Tabs      Take  by mouth.

## 2017-07-24 ENCOUNTER — TELEPHONE (OUTPATIENT)
Dept: FAMILY MEDICINE | Facility: CLINIC | Age: 77
End: 2017-07-24

## 2017-07-25 ENCOUNTER — MYC MEDICAL ADVICE (OUTPATIENT)
Dept: FAMILY MEDICINE | Facility: CLINIC | Age: 77
End: 2017-07-25

## 2017-07-25 NOTE — TELEPHONE ENCOUNTER
Please see My Chart message below and advise as appropriate.  Geovanna Arellano RN - Triage  [facility]

## 2017-10-11 ENCOUNTER — OFFICE VISIT (OUTPATIENT)
Dept: FAMILY MEDICINE | Facility: CLINIC | Age: 77
End: 2017-10-11
Payer: MEDICARE

## 2017-10-11 VITALS
OXYGEN SATURATION: 99 % | BODY MASS INDEX: 34.06 KG/M2 | HEIGHT: 67 IN | SYSTOLIC BLOOD PRESSURE: 134 MMHG | RESPIRATION RATE: 14 BRPM | WEIGHT: 217 LBS | TEMPERATURE: 96.7 F | HEART RATE: 87 BPM | DIASTOLIC BLOOD PRESSURE: 79 MMHG

## 2017-10-11 DIAGNOSIS — Z23 NEED FOR PROPHYLACTIC VACCINATION AND INOCULATION AGAINST INFLUENZA: ICD-10-CM

## 2017-10-11 DIAGNOSIS — E11.9 TYPE 2 DIABETES MELLITUS WITHOUT COMPLICATION, UNSPECIFIED LONG TERM INSULIN USE STATUS: Primary | ICD-10-CM

## 2017-10-11 DIAGNOSIS — E78.2 MIXED HYPERLIPIDEMIA: ICD-10-CM

## 2017-10-11 DIAGNOSIS — N40.0 BENIGN NON-NODULAR PROSTATIC HYPERPLASIA WITHOUT LOWER URINARY TRACT SYMPTOMS: ICD-10-CM

## 2017-10-11 DIAGNOSIS — R80.9 MICROALBUMINURIA: ICD-10-CM

## 2017-10-11 LAB — HBA1C MFR BLD: 6.3 % (ref 4.3–6)

## 2017-10-11 PROCEDURE — 90471 IMMUNIZATION ADMIN: CPT | Performed by: FAMILY MEDICINE

## 2017-10-11 PROCEDURE — 80048 BASIC METABOLIC PNL TOTAL CA: CPT | Performed by: FAMILY MEDICINE

## 2017-10-11 PROCEDURE — 83036 HEMOGLOBIN GLYCOSYLATED A1C: CPT | Performed by: FAMILY MEDICINE

## 2017-10-11 PROCEDURE — 36415 COLL VENOUS BLD VENIPUNCTURE: CPT | Performed by: FAMILY MEDICINE

## 2017-10-11 PROCEDURE — 84443 ASSAY THYROID STIM HORMONE: CPT | Performed by: FAMILY MEDICINE

## 2017-10-11 PROCEDURE — 90662 IIV NO PRSV INCREASED AG IM: CPT | Performed by: FAMILY MEDICINE

## 2017-10-11 PROCEDURE — 99214 OFFICE O/P EST MOD 30 MIN: CPT | Mod: 25 | Performed by: FAMILY MEDICINE

## 2017-10-11 PROCEDURE — 84460 ALANINE AMINO (ALT) (SGPT): CPT | Performed by: FAMILY MEDICINE

## 2017-10-11 RX ORDER — LISINOPRIL 5 MG/1
5 TABLET ORAL DAILY
Qty: 90 TABLET | Refills: 1 | Status: SHIPPED | OUTPATIENT
Start: 2017-10-11 | End: 2018-06-18

## 2017-10-11 RX ORDER — TERAZOSIN 5 MG/1
CAPSULE ORAL
Qty: 90 CAPSULE | Refills: 1 | Status: SHIPPED | OUTPATIENT
Start: 2017-10-11 | End: 2018-03-30

## 2017-10-11 RX ORDER — PRAVASTATIN SODIUM 20 MG
TABLET ORAL
Qty: 90 TABLET | Refills: 1 | Status: SHIPPED | OUTPATIENT
Start: 2017-10-11 | End: 2018-12-08

## 2017-10-11 NOTE — PROGRESS NOTES
Injectable Influenza Immunization Documentation    1.  Is the person to be vaccinated sick today?   No    2. Does the person to be vaccinated have an allergy to a component   of the vaccine?   No    3. Has the person to be vaccinated ever had a serious reaction   to influenza vaccine in the past?   No    4. Has the person to be vaccinated ever had Guillain-Barré syndrome?   No    Form completed by Mary Hussein, [facility]

## 2017-10-11 NOTE — MR AVS SNAPSHOT
After Visit Summary   10/11/2017    Vikash Arredondo II    MRN: [id number]           Patient Information     Date Of Birth          1940        Visit Information        Provider Department      10/11/2017 11:20 AM Hamilton Romo MD [facility] [place]        Today's Diagnoses     Type 2 diabetes mellitus without complication, unspecified long term insulin use status (H)    -  1    Need for prophylactic vaccination and inoculation against influenza        Mixed hyperlipidemia        Benign non-nodular prostatic hyperplasia without lower urinary tract symptoms        Microalbuminuria           Follow-ups after your visit        Who to contact     If you have questions or need follow up information about today's clinic visit or your schedule please contact [facility] directly at [phone].  Normal or non-critical lab and imaging results will be communicated to you by MyChart, letter or phone within 4 business days after the clinic has received the results. If you do not hear from us within 7 days, please contact the clinic through Duxterhart or phone. If you have a critical or abnormal lab result, we will notify you by phone as soon as possible.  Submit refill requests through Riverbed Technology or call your pharmacy and they will forward the refill request to us. Please allow 3 business days for your refill to be completed.          Additional Information About Your Visit        MyChart Information     Riverbed Technology gives you secure access to your electronic health record. If you see a primary care provider, you can also send messages to your care team and make appointments. If you have questions, please call your primary care clinic.  If you do not have a primary care provider, please call [phone] and they will assist you.        Care EveryWhere ID     This is your Care EveryWhere ID. This could be used by other organizations to access your [place] medical records  [id number]    "     Your Vitals Were     Pulse Temperature Respirations Height Pulse Oximetry BMI (Body Mass Index)    87 96.7  F (35.9  C) (Tympanic) 14 5' 7\" (1.702 m) 99% 33.99 kg/m2       Blood Pressure from Last 3 Encounters:   10/11/17 134/79   07/20/17 122/62   07/17/17 122/74    Weight from Last 3 Encounters:   10/11/17 217 lb (98.4 kg)   07/13/17 219 lb (99.3 kg)   03/17/17 217 lb (98.4 kg)              We Performed the Following     ALT     Basic metabolic panel  (Ca, Cl, CO2, Creat, Gluc, K, Na, BUN)     HEMOGLOBIN A1C     TSH WITH FREE T4 REFLEX          Where to get your medicines      These medications were sent to [place] [address] [facility] - [place], [place] - [address]  [address] [facility] [address]     Phone:  [phone]     lisinopril 5 MG tablet    metFORMIN 500 MG tablet    pravastatin 20 MG tablet    terazosin 5 MG capsule          Primary Care Provider Office Phone # Fax #    Hamilton Romo [phone]       [address] [facility] [address]        Equal Access to Services     [facility]: Hadii estefania roach hadasho Soomaali, waaxda luqadaha, qaybta kaalmada adeegyada, waxay rachana hinojosa. So [facility] [phone].    ATENCIÓN: Si habla español, tiene a shaver disposición servicios gratuitos de asistencia lingüística. Llame al [phone].    We comply with applicable federal civil rights laws and [place] laws. We do not discriminate on the basis of race, color, national origin, age, disability, sex, sexual orientation, or gender identity.            Thank you!     Thank you for choosing [facility]  for your care. Our goal is always to provide you with excellent care. Hearing back from our patients is one way we can continue to improve our services. Please take a few minutes to complete the written survey that you may receive in the mail after your visit with us. Thank you!             Your Updated Medication List - Protect " others around you: Learn how to safely use, store and throw away your medicines at www.disposemymeds.org.          This list is accurate as of: 10/11/17 11:40 AM.  Always use your most recent med list.                   Brand Name Dispense Instructions for use Diagnosis    aspirin 81 MG tablet      1 tab po QD (Once per day)    Type II or unspecified type diabetes mellitus without mention of complication, not stated as uncontrolled, Other and unspecified hyperlipidemia       CALCIUM + D PO      Take  by mouth.        lisinopril 5 MG tablet    PRINIVIL/ZESTRIL    90 tablet    Take 1 tablet (5 mg) by mouth daily    Microalbuminuria       metFORMIN 500 MG tablet    GLUCOPHAGE    270 tablet    Take 1 tablet (500 mg) by mouth 3 times daily (with meals) . Need appointment for further refill    Type 2 diabetes mellitus without complication, unspecified long term insulin use status (H)       MULTIVITAMIN TABS   OR      1 DAILY        pravastatin 20 MG tablet    PRAVACHOL    90 tablet    TAKE 0.5 TABLET BY MOUTH DAILY    Type 2 diabetes mellitus without complication, unspecified long term insulin use status (H), Mixed hyperlipidemia       terazosin 5 MG capsule    HYTRIN    90 capsule    TAKE ONE CAPSULE BY MOUTH NIGHTLY AT BEDTIME    Benign non-nodular prostatic hyperplasia without lower urinary tract symptoms       Vitamin D3 3000 UNITS Tabs      Take  by mouth.

## 2017-10-11 NOTE — NURSING NOTE
"Chief Complaint   Patient presents with     Diabetes       Initial /79 (Cuff Size: Adult Large)  Pulse 87  Temp 96.7  F (35.9  C) (Tympanic)  Resp 14  Ht 5' 7\" (1.702 m)  Wt 217 lb (98.4 kg)  SpO2 99%  BMI 33.99 kg/m2 Estimated body mass index is 33.99 kg/(m^2) as calculated from the following:    Height as of this encounter: 5' 7\" (1.702 m).    Weight as of this encounter: 217 lb (98.4 kg).  Medication Reconciliation: complete   Mary Hussein, CMA    "

## 2017-10-12 LAB
ALT SERPL W P-5'-P-CCNC: 48 U/L (ref 0–70)
ANION GAP SERPL CALCULATED.3IONS-SCNC: 11 MMOL/L (ref 3–14)
BUN SERPL-MCNC: 19 MG/DL (ref 7–30)
CALCIUM SERPL-MCNC: 9.3 MG/DL (ref 8.5–10.1)
CHLORIDE SERPL-SCNC: 105 MMOL/L (ref 94–109)
CO2 SERPL-SCNC: 22 MMOL/L (ref 20–32)
CREAT SERPL-MCNC: 1.17 MG/DL (ref 0.66–1.25)
GFR SERPL CREATININE-BSD FRML MDRD: 60 ML/MIN/1.7M2
GLUCOSE SERPL-MCNC: 118 MG/DL (ref 70–99)
POTASSIUM SERPL-SCNC: 4.6 MMOL/L (ref 3.4–5.3)
SODIUM SERPL-SCNC: 138 MMOL/L (ref 133–144)
TSH SERPL DL<=0.005 MIU/L-ACNC: 2.13 MU/L (ref 0.4–4)

## 2017-12-01 ENCOUNTER — APPOINTMENT (OUTPATIENT)
Dept: MRI IMAGING | Facility: CLINIC | Age: 77
End: 2017-12-01
Attending: EMERGENCY MEDICINE
Payer: MEDICARE

## 2017-12-01 ENCOUNTER — TELEPHONE (OUTPATIENT)
Dept: FAMILY MEDICINE | Facility: CLINIC | Age: 77
End: 2017-12-01

## 2017-12-01 ENCOUNTER — HOSPITAL ENCOUNTER (EMERGENCY)
Facility: CLINIC | Age: 77
Discharge: HOME | End: 2017-12-01
Attending: EMERGENCY MEDICINE | Admitting: EMERGENCY MEDICINE
Payer: MEDICARE

## 2017-12-01 VITALS
TEMPERATURE: 98.3 F | WEIGHT: 215 LBS | RESPIRATION RATE: 16 BRPM | DIASTOLIC BLOOD PRESSURE: 79 MMHG | HEART RATE: 85 BPM | OXYGEN SATURATION: 97 % | BODY MASS INDEX: 33.67 KG/M2 | SYSTOLIC BLOOD PRESSURE: 146 MMHG

## 2017-12-01 DIAGNOSIS — G51.0 BELL'S PALSY: ICD-10-CM

## 2017-12-01 LAB
ANION GAP SERPL CALCULATED.3IONS-SCNC: 10 MMOL/L (ref 3–14)
BUN SERPL-MCNC: 28 MG/DL (ref 7–30)
CALCIUM SERPL-MCNC: 9.4 MG/DL (ref 8.5–10.1)
CHLORIDE SERPL-SCNC: 104 MMOL/L (ref 94–109)
CO2 SERPL-SCNC: 23 MMOL/L (ref 20–32)
CREAT SERPL-MCNC: 1.16 MG/DL (ref 0.66–1.25)
ERYTHROCYTE [DISTWIDTH] IN BLOOD BY AUTOMATED COUNT: 13.3 % (ref 10–15)
GFR SERPL CREATININE-BSD FRML MDRD: 61 ML/MIN/1.7M2
GLUCOSE SERPL-MCNC: 146 MG/DL (ref 70–99)
HCT VFR BLD AUTO: 38.1 % (ref 40–53)
HGB BLD-MCNC: 13.2 G/DL (ref 13.3–17.7)
MCH RBC QN AUTO: 29.8 PG (ref 26.5–33)
MCHC RBC AUTO-ENTMCNC: 34.6 G/DL (ref 31.5–36.5)
MCV RBC AUTO: 86 FL (ref 78–100)
PLATELET # BLD AUTO: 180 10E9/L (ref 150–450)
POTASSIUM SERPL-SCNC: 4.2 MMOL/L (ref 3.4–5.3)
RBC # BLD AUTO: 4.43 10E12/L (ref 4.4–5.9)
SODIUM SERPL-SCNC: 137 MMOL/L (ref 133–144)
WBC # BLD AUTO: 7 10E9/L (ref 4–11)

## 2017-12-01 PROCEDURE — 85027 COMPLETE CBC AUTOMATED: CPT | Performed by: EMERGENCY MEDICINE

## 2017-12-01 PROCEDURE — 70553 MRI BRAIN STEM W/O & W/DYE: CPT

## 2017-12-01 PROCEDURE — 99285 EMERGENCY DEPT VISIT HI MDM: CPT | Mod: 25

## 2017-12-01 PROCEDURE — A9585 GADOBUTROL INJECTION: HCPCS | Performed by: EMERGENCY MEDICINE

## 2017-12-01 PROCEDURE — 80048 BASIC METABOLIC PNL TOTAL CA: CPT | Performed by: EMERGENCY MEDICINE

## 2017-12-01 PROCEDURE — 25000128 H RX IP 250 OP 636: Performed by: EMERGENCY MEDICINE

## 2017-12-01 PROCEDURE — 93005 ELECTROCARDIOGRAM TRACING: CPT

## 2017-12-01 RX ORDER — GADOBUTROL 604.72 MG/ML
10 INJECTION INTRAVENOUS ONCE
Status: COMPLETED | OUTPATIENT
Start: 2017-12-01 | End: 2017-12-01

## 2017-12-01 RX ORDER — LORAZEPAM 2 MG/ML
0.5 INJECTION INTRAMUSCULAR
Status: DISCONTINUED | OUTPATIENT
Start: 2017-12-01 | End: 2017-12-01 | Stop reason: HOSPADM

## 2017-12-01 RX ADMIN — GADOBUTROL 10 ML: 604.72 INJECTION INTRAVENOUS at 17:11

## 2017-12-01 ASSESSMENT — ENCOUNTER SYMPTOMS
FEVER: 0
WEAKNESS: 0
NUMBNESS: 1
FACIAL ASYMMETRY: 1
HEADACHES: 0

## 2017-12-01 NOTE — ED AVS SNAPSHOT
Emergency Department    [address] [facility] [address]    Phone:  [phone]    Fax:  [phone]                                       Vikash Arredondo II   MRN: [id number]    Department:   Emergency Department   Date of Visit:  12/1/2017           Patient Information     Date Of Birth          1940        Your diagnoses for this visit were:     Bell's palsy        You were seen by Sherif Henriquez MD.      Follow-up Information     Follow up with Hamilton Romo MD In 2 days.    Specialty:  Family Practice    Contact information:    [address] [place] [address]  [phone]          Follow up with  Emergency Department.    Specialty:  EMERGENCY MEDICINE    Why:  As needed    Contact information:    [address] [facility] [address] [phone]        Discharge Instructions         Diallo s Palsy    Bell's Palsy is a problem involving the nerve that controls the muscles on one side of the face.  The cause is unknown, but may be related to inflammation of the nerve. Symptoms usually appear only on the affected side. They may include:    Inability to close the eyelid    Tearing of the eye    Facial drooping    Drooling    Numbness or pain    Changes in taste    Sound sensitivity  Damage to the eye can be a serious problem. The inability to blink can cause the eye to dry out. An ulcer (sore) can then form on the cornea. Also, not blinking means that the eye has no protection from dirt and dust particles.  Treatment involves protecting and moistening the eye. Medicines may also help.  Most persons recover completely within 3 to 6 months. However, the condition sometimes returns months or years later.  Home care    Get plenty of rest and eat a healthy diet to help yourself recover.    Use Artificial Tears frequently during the day and at bedtime to prevent drying. These drops are available without prescription at your drug store.    Wear  protective glasses especially when outside to protect from flying debris. Use sunglasses when outdoors.    Tape the eyelid closed at bedtime with a paper tape (available at your pharmacy). This has a very mild adhesive to avoid injury to the lid. This will protect your eye from injury while you sleep.    Sometimes medicines are prescribed to reduce inflammation or treat specific viral infections of the nerve. If medicines are prescribed, take them exactly as directed. Usually there is a 10-day course of medication that is started as soon as possible. Taking this medicine as prescribed will help with a full recovery.    Use low heat, for example from a heating pad, on the affected area. This can help reduce pain and swelling.    If you are experiencing sever pain, contact your health care provider.  Follow-up care  Follow up with your healthcare provider as advised. If you referred to a specialist, make that appointment promptly.  When to seek medical advice  Call your healthcare provider if any of the following occur:    Severe eye redness    Eye pain    Thick drainage from the eye    Change in vision (such as double vision or losing vision)    Fever over 100.4 F (38 C) or as directed by your healthcare provider    Headache, neck pain, weakness, trouble speaking or walking, or other unexplained symptoms  Date Last Reviewed: 8/23/2015 2000-2017 [employer]. [address] [place] [address], [place] [address]. All rights reserved. This information is not intended as a substitute for professional medical care. Always follow your healthcare professional's instructions.          24 Hour Appointment Hotline       To make an appointment at any [facility], call [phone] ([phone]). If you don't have a family doctor or clinic, we will help you find one. [facility] clinics are conveniently located to serve the needs of you and your family.             Review of your medicines      Our records show that  you are taking the medicines listed below. If these are incorrect, please call your family doctor or clinic.        Dose / Directions Last dose taken    aspirin 81 MG tablet        1 tab po QD (Once per day)   Refills:  0        CALCIUM + D PO        Take  by mouth.   Refills:  0        lisinopril 5 MG tablet   Commonly known as:  PRINIVIL/ZESTRIL   Dose:  5 mg   Quantity:  90 tablet        Take 1 tablet (5 mg) by mouth daily   Refills:  1        metFORMIN 500 MG tablet   Commonly known as:  GLUCOPHAGE   Dose:  500 mg   Quantity:  270 tablet        Take 1 tablet (500 mg) by mouth 3 times daily (with meals) . Need appointment for further refill   Refills:  1        MULTIVITAMIN TABS   OR        1 DAILY   Refills:  0        pravastatin 20 MG tablet   Commonly known as:  PRAVACHOL   Quantity:  90 tablet        TAKE 0.5 TABLET BY MOUTH DAILY   Refills:  1        terazosin 5 MG capsule   Commonly known as:  HYTRIN   Quantity:  90 capsule        TAKE ONE CAPSULE BY MOUTH NIGHTLY AT BEDTIME   Refills:  1        Vitamin D3 3000 UNITS Tabs        Take  by mouth.   Refills:  0                Procedures and tests performed during your visit     Basic metabolic panel    CBC with platelets    EKG 12-lead, tracing only    MR Brain w/o & w Contrast      Orders Needing Specimen Collection     None      Pending Results     Date and Time Order Name Status Description    12/1/2017 1557 MR Brain w/o & w Contrast Preliminary     12/1/2017 1545 EKG 12-lead, tracing only Preliminary             Pending Culture Results     No orders found from 11/29/2017 to 12/2/2017.            Pending Results Instructions     If you had any lab results that were not finalized at the time of your Discharge, you can call the ED Lab Result RN at [phone]. You will be contacted by this team for any positive Lab results or changes in treatment. The nurses are available 7 days a week from 10A to 6:30P.  You can leave a message 24 hours per day and they  will return your call.        Test Results From Your Hospital Stay        12/1/2017  4:03 PM      Component Results     Component Value Ref Range & Units Status    WBC 7.0 4.0 - 11.0 10e9/L Final    RBC Count 4.43 4.4 - 5.9 10e12/L Final    Hemoglobin 13.2 (L) 13.3 - 17.7 g/dL Final    Hematocrit 38.1 (L) 40.0 - 53.0 % Final    MCV 86 78 - 100 fl Final    MCH 29.8 26.5 - 33.0 pg Final    MCHC 34.6 31.5 - 36.5 g/dL Final    RDW 13.3 10.0 - 15.0 % Final    Platelet Count 180 150 - 450 10e9/L Final         12/1/2017  4:34 PM      Component Results     Component Value Ref Range & Units Status    Sodium 137 133 - 144 mmol/L Final    Potassium 4.2 3.4 - 5.3 mmol/L Final    Chloride 104 94 - 109 mmol/L Final    Carbon Dioxide 23 20 - 32 mmol/L Final    Anion Gap 10 3 - 14 mmol/L Final    Glucose 146 (H) 70 - 99 mg/dL Final    Urea Nitrogen 28 7 - 30 mg/dL Final    Creatinine 1.16 0.66 - 1.25 mg/dL Final    GFR Estimate 61 >60 mL/min/1.7m2 Final    Non  GFR Calc    GFR Estimate If Black 74 >60 mL/min/1.7m2 Final    African American GFR Calc    Calcium 9.4 8.5 - 10.1 mg/dL Final         12/1/2017  5:15 PM      Narrative     MR BRAIN W/O & W CONTRAST 12/1/2017 5:11 PM     HISTORY: left facial weakness, right facial numbness, previous left  leg weakness;     TECHNIQUE: Multiplanar, multisequence MRI of the brain without and  with 10 mL Gadavist  IV contrast material.    COMPARISON: None.    FINDINGS:  There is generalized atrophy of the brain.  White matter  changes are present in the cerebral hemispheres that are consistent  with small vessel ischemic disease in this age patient. There is no  evidence of hemorrhage, mass, demyelination, acute infarct, or  anomaly. There are no gadolinium enhancing lesions. The facial  structures appear normal. The arteries at the base of the brain and  the dural venous sinuses appear patent.        Impression     IMPRESSION:   1. No acute pathology is identified. No bleed,  mass, or infarcts are  seen.  2. Age-related changes including generalized atrophy of the brain.  White matter changes in the cerebral hemispheres consistent with small  vessel ischemic disease in this age patient.                Clinical Quality Measure: Blood Pressure Screening     Your blood pressure was checked while you were in the emergency department today. The last reading we obtained was  BP: 146/79 . Please read the guidelines below about what these numbers mean and what you should do about them.  If your systolic blood pressure (the top number) is less than 120 and your diastolic blood pressure (the bottom number) is less than 80, then your blood pressure is normal. There is nothing more that you need to do about it.  If your systolic blood pressure (the top number) is 120-139 or your diastolic blood pressure (the bottom number) is 80-89, your blood pressure may be higher than it should be. You should have your blood pressure rechecked within a year by a primary care provider.  If your systolic blood pressure (the top number) is 140 or greater or your diastolic blood pressure (the bottom number) is 90 or greater, you may have high blood pressure. High blood pressure is treatable, but if left untreated over time it can put you at risk for heart attack, stroke, or kidney failure. You should have your blood pressure rechecked by a primary care provider within the next 4 weeks.  If your provider in the emergency department today gave you specific instructions to follow-up with your doctor or provider even sooner than that, you should follow that instruction and not wait for up to 4 weeks for your follow-up visit.        Thank you for choosing [facility]       Thank you for choosing [facility] for your care. Our goal is always to provide you with excellent care. Hearing back from our patients is one way we can continue to improve our services. Please take a few minutes to complete the written survey that you may  receive in the mail after you visit with us. Thank you!        [employer]: water Information     [employer] gives you secure access to your electronic health record. If you see a primary care provider, you can also send messages to your care team and make appointments. If you have questions, please call your primary care clinic.  If you do not have a primary care provider, please call [phone] and they will assist you.        Care EveryWhere ID     This is your Care EveryWhere ID. This could be used by other organizations to access your [facility] medical records  [id number]        Equal Access to Services     [facility] : Aditya Baig, david yañez, nadine singh, tarik godwin . So [facility] [phone].    ATENCIÓN: Si habla español, tiene a shaver disposición servicios gratuitos de asistencia lingüística. Llame al [phone].    We comply with applicable federal civil rights laws and [place] laws. We do not discriminate on the basis of race, color, national origin, age, disability, sex, sexual orientation, or gender identity.            After Visit Summary       This is your record. Keep this with you and show to your community pharmacist(s) and doctor(s) at your next visit.

## 2017-12-01 NOTE — TELEPHONE ENCOUNTER
Patient call to report that the left side of his face is sagging including his eyes and mouth for the past few days.    Report mild numbness.     Patient is alert and oriented.  Speech is clear.    Advised to have someone to drive him to ER immediately.  Agreed with plan and will proceed to [facility] ER right now.      Che Berg RN

## 2017-12-01 NOTE — ED AVS SNAPSHOT
Emergency Department    [address] [place] [address]    Phone:  [phone]    Fax:  [phone]                                       Vikash Arredondo II   MRN: [id number]    Department:   Emergency Department   Date of Visit:  12/1/2017           After Visit Summary Signature Page     I have received my discharge instructions, and my questions have been answered. I have discussed any challenges I see with this plan with the nurse or doctor.    ..........................................................................................................................................  Patient/Patient Representative Signature      ..........................................................................................................................................  Patient Representative Print Name and Relationship to Patient    ..................................................               ................................................  Date                                            Time    ..........................................................................................................................................  Reviewed by Signature/Title    ...................................................              ..............................................  Date                                                            Time

## 2017-12-01 NOTE — DISCHARGE INSTRUCTIONS
Diallo s Palsy    Bell's Palsy is a problem involving the nerve that controls the muscles on one side of the face.  The cause is unknown, but may be related to inflammation of the nerve. Symptoms usually appear only on the affected side. They may include:    Inability to close the eyelid    Tearing of the eye    Facial drooping    Drooling    Numbness or pain    Changes in taste    Sound sensitivity  Damage to the eye can be a serious problem. The inability to blink can cause the eye to dry out. An ulcer (sore) can then form on the cornea. Also, not blinking means that the eye has no protection from dirt and dust particles.  Treatment involves protecting and moistening the eye. Medicines may also help.  Most persons recover completely within 3 to 6 months. However, the condition sometimes returns months or years later.  Home care    Get plenty of rest and eat a healthy diet to help yourself recover.    Use Artificial Tears frequently during the day and at bedtime to prevent drying. These drops are available without prescription at your drug store.    Wear protective glasses especially when outside to protect from flying debris. Use sunglasses when outdoors.    Tape the eyelid closed at bedtime with a paper tape (available at your pharmacy). This has a very mild adhesive to avoid injury to the lid. This will protect your eye from injury while you sleep.    Sometimes medicines are prescribed to reduce inflammation or treat specific viral infections of the nerve. If medicines are prescribed, take them exactly as directed. Usually there is a 10-day course of medication that is started as soon as possible. Taking this medicine as prescribed will help with a full recovery.    Use low heat, for example from a heating pad, on the affected area. This can help reduce pain and swelling.    If you are experiencing sever pain, contact your health care provider.  Follow-up care  Follow up with your healthcare provider as advised.  If you referred to a specialist, make that appointment promptly.  When to seek medical advice  Call your healthcare provider if any of the following occur:    Severe eye redness    Eye pain    Thick drainage from the eye    Change in vision (such as double vision or losing vision)    Fever over 100.4 F (38 C) or as directed by your healthcare provider    Headache, neck pain, weakness, trouble speaking or walking, or other unexplained symptoms  Date Last Reviewed: 8/23/2015 2000-2017 [employer]. [address] [place] [address]. All rights reserved. This information is not intended as a substitute for professional medical care. Always follow your healthcare professional's instructions.

## 2017-12-01 NOTE — ED PROVIDER NOTES
History     Chief Complaint:  Facial Droop    HPI   Vikash Arredondo II is a 77 year old male who presents with a left sided facial droop that began five days ago, and developed gradually. He states he is also having some changes in the let side of his vision, as it is hard to read things that are close to him. The patient also has been having some numbness on the right side of his face. The patient does not have a headache. No upper or lower extremity weakness. Denies fevers or infectious symptoms.     Allergies:  The patient has no known drug allergies.      Medications:    Glucophage  Pravachol  Hytrin  Lisinopril  Aspirin     Past Medical History:    Cataracts  Type II DM  BPH    Past Surgical History:    Laminotomy  Colonoscopy  Extracapsular cataract extration  Nasal septum repair  Hernia repair  Vasectomy  Rotator cuff repair    Family History:    Colorectal cancer  CAD    Social History:  Relationship status: Single  Tobacco use: Former smoker (Quit 1961)  Alcohol use: Negative  The patient presents with his wife.     Review of Systems   Constitutional: Negative for fever.   Neurological: Positive for facial asymmetry and numbness. Negative for weakness and headaches.   All other systems reviewed and are negative.      Physical Exam   First Vitals:  BP: 156/83  Pulse: 85  Temp: 98.3  F (36.8  C)  Resp: 16  Weight: 97.5 kg (215 lb)  SpO2: 95 %    Physical Exam  Eyes:  The pupils are equal and round    Conjunctivae and sclerae are normal  ENT:    The nose is normal    Pinnae are normal    The oropharynx is normal  CV:  Regular rate and rhythm     No edema  Resp:  Lungs are clear    Non-labored    No rales    No wheezing   GI:  Abdomen is soft, there is no rigidity    No distension    No rebound tenderness   MS:  Normal muscular tone    No asymmetric leg swelling  Skin:  No rash or acute skin lesions noted  Neuro:   Awake, alert.      Speech is normal and fluent.    Weakness of left side of face, including  forehead and eyelid muscle. Sensation intact to light touch on bilateral face. No drift in upper or lower extremities. No weakness detected in lower extremities with hip flexion, dorsiflexion/plantarflexion of the ankles.  Extraocular movements are normal. Normal finger nose finger bilaterally.    Moves all extremities      Emergency Department Course     ECG (15:59:58):  Indication: Facial weakness..   Rate 96 bpm. MD interval 202. QRS duration 72. QT/QTc 330/416. P-R-T axes -22.   Interpretation: Normal sinus rhythm. Inferior infarct, age undetermined.  Agree with computer interpretation.   Interpreted at 1613 by Dr. Henriquez.     Imaging:  Radiographic findings were communicated with the patient who voiced understanding of the findings.    MR brain, with and without contrast, per radiology:   1. No acute pathology is identified. No bleed, mass, or infarcts are seen.  2. Age-related changes including generalized atrophy of the brain. White matter changes in the cerebral hemispheres consistent with small vessel ischemic disease in this age patient.    Laboratory:  CBC: WBC 7.0, HGB 13.2 (L),   BMP: Glucose 146 (H), o/w WNL (Creatinine 1.16)    Emergency Department Course:  Nursing notes and vitals reviewed.  I performed an exam of the patient as documented above.  The above workup was undertaken.  1743: I discussed the patient with Dr. Greenfield of neurology.   1747: I rechecked the patient and discussed results.    Findings and plan explained to the Patient. Patient discharged home, status improved, with instructions regarding supportive care, medications, and reasons to return as well as the importance of close follow-up was reviewed.      Impression & Plan      Medical Decision Making:  Vikash Arredondo II is a 77 year old male with a history of diabetes, who presents with left-sided facial droop that he reports started Sunday, and later stated it may have begun on Tuesday or Wednesday. On exam here, he  has weakness on the left side of his face, and previously reported numbness on right side, though can feel light touch. No extremity weakness was identified, though he does report that earlier he felt as though his left leg might be slightly weak, although he is unsure. Given other symptoms that may be associated with the left sided facial weakness, an MRI brain is obtained to rule out stroke. I would expect if he had a stroke, given the duration of his symptoms, this would definitely be seen on MRI. MRI is negative for any acute findings. Given reported history and time of onset, it is unclear to me if steroids of antivirals would be helpful. I discussed the case with Dr. Greenfield of neurology, and he thought that it would be unlikely to be helpful to have steroids or antivirals at this time, especially in light of his history of DM. I discussed this with the patient. He should follow up with PCP. Return to ED with new or concerning symptoms.      Diagnosis:    ICD-10-CM   1. Bell's palsy G51.0     Disposition:  Discharge to home with primary care follow up.     Ty MELVIN, am serving as a scribe on 12/1/2017 at 3:36 PM to personally document services performed by Sherif Henriquez MD, based on my observations and the provider's statements to me.     EMERGENCY DEPARTMENT       Sherif Henriquez MD  12/01/17 3399

## 2017-12-02 LAB — INTERPRETATION ECG - MUSE: NORMAL

## 2017-12-03 ENCOUNTER — MYC MEDICAL ADVICE (OUTPATIENT)
Dept: FAMILY MEDICINE | Facility: CLINIC | Age: 77
End: 2017-12-03

## 2017-12-04 NOTE — TELEPHONE ENCOUNTER
"Hospital/TCU/ED for chronic condition Discharge Protocol    \"Hi, my name is Barbara Hernandez, a registered nurse, and I am calling from [facility]  I am calling to follow up and see how things are going for you after your recent emergency visit/hospital/TCU stay.\"    Tell me how you are doing now that you are home?\" fine      Discharge Instructions    \"Let's review your discharge instructions.  What is/are the follow-up recommendations?  Pt. Response: see Dr. Romo to discuss F/U    \"Has an appointment with your primary care provider been scheduled?\"   No (schedule appointment)    \"When you see the provider, I would recommend that you bring your medications with you.\"    Medications    \"Tell me what changed about your medicines when you discharged?\"    Changes to chronic meds?    0-1    \"What questions do you have about your medications?\"    None     New diagnoses of heart failure, COPD, diabetes, or MI?    No                  Medication reconciliation completed? Yes  Was MTM referral placed (*Make sure to put transitions as reason for referral)?   No    Call Summary    \"What questions or concerns do you have about your recent visit and your follow-up care?\"     none    \"If you have questions or things don't continue to improve, we encourage you contact us through the main clinic number (give number).  Even if the clinic is not open, triage nurses are available 24/7 to help you.     We would like you to know that our clinic has extended hours (provide information).  We also have urgent care (provide details on closest location and hours/contact info)\"      \"Thank you for your time and take care!\"         "

## 2017-12-06 ENCOUNTER — OFFICE VISIT (OUTPATIENT)
Dept: FAMILY MEDICINE | Facility: CLINIC | Age: 77
End: 2017-12-06
Payer: MEDICARE

## 2017-12-06 VITALS
HEART RATE: 79 BPM | DIASTOLIC BLOOD PRESSURE: 70 MMHG | SYSTOLIC BLOOD PRESSURE: 135 MMHG | TEMPERATURE: 96.8 F | OXYGEN SATURATION: 98 % | BODY MASS INDEX: 34.69 KG/M2 | WEIGHT: 221 LBS | HEIGHT: 67 IN | RESPIRATION RATE: 12 BRPM

## 2017-12-06 DIAGNOSIS — G51.0 BELL PALSY: Primary | ICD-10-CM

## 2017-12-06 DIAGNOSIS — E11.9 TYPE 2 DIABETES MELLITUS WITHOUT COMPLICATION, UNSPECIFIED LONG TERM INSULIN USE STATUS: ICD-10-CM

## 2017-12-06 PROCEDURE — 99214 OFFICE O/P EST MOD 30 MIN: CPT | Performed by: FAMILY MEDICINE

## 2017-12-06 RX ORDER — PREDNISONE 5 MG/1
5 TABLET ORAL DAILY
Qty: 7 TABLET | Refills: 0 | Status: SHIPPED | OUTPATIENT
Start: 2017-12-06 | End: 2017-12-13

## 2017-12-06 NOTE — PROGRESS NOTES
SUBJECTIVE:   Vikash Arredondo II is a 77 year old male who presents to clinic today for the following health issues:      ED/UC Followup:    Facility:  [facility]   Date of visit: 12/1/17  Reason for visit: Bell's Palsy   Current Status: slight improvement        Problem list and histories reviewed & adjusted, as indicated.  Additional history: as documented    Patient Active Problem List   Diagnosis     BPH (benign prostatic hyperplasia)     CARDIOVASCULAR SCREENING; LDL GOAL LESS THAN 100     Advanced directives, counseling/discussion     Obesity (BMI 30.0-34.9)     Type 2 diabetes mellitus without complications (H)     Normocytic normochromic anemia     Past Surgical History:   Procedure Laterality Date     C LAMINOTOMY,LUMBAR DISK,1 INTRSP  1999     COLONOSCOPY  2/2002    Normal     COLONOSCOPY  8/11/07    Diverticulosis; repeat in 10 years     EXTRACAPSULAR CATARACT EXTRATION WITH INTRAOCULAR LENS IMPLANT  7/2010    right eye     EXTRACAPSULAR CATARACT EXTRATION WITH INTRAOCULAR LENS IMPLANT  11/2010    left eye     HC REPAIR INCISIONAL HERNIA,REDUCIBLE  1986    Hernia Repair, Incisional, Unilateral     HC REPAIR OF NASAL SEPTUM  2002     HC REPAIR ROTATOR CUFF,ACUTE  8/2008    left shoulder     HC VASECTOMY UNILAT/BILAT W POSTOP SEMEN  1974    Vasectomy     LAPAROSCOPIC HERNIORRHAPHY VENTRAL  1/23/2013    Procedure: LAPAROSCOPIC HERNIORRHAPHY VENTRAL;  LAPAROSCOPIC VENTRAL  RIGHT HERNIA REPAIR WITH MESH;  Surgeon: Jeanmarie Hutchison MD;  Location: [facility]       Social History   Substance Use Topics     Smoking status: Former Smoker     Packs/day: 0.50     Years: 1.00     Types: Cigarettes     Quit date: 1/2/1961     Smokeless tobacco: Never Used     Alcohol use No     Family History   Problem Relation Age of Onset     Cancer - colorectal Paternal Uncle      C.A.D. Paternal Uncle      Anesthesia Reaction No family hx of      Blood Disease No family hx of      Thyroid Disease No family hx of       Eye Disorder No family hx of      Hypertension No family hx of          Current Outpatient Prescriptions   Medication Sig Dispense Refill     IBUPROFEN PO Take by mouth every 4 hours as needed for moderate pain       vitamin B complex with vitamin C (VITAMIN  B COMPLEX) TABS tablet Take 1 tablet by mouth daily       Naproxen Sodium (ALEVE PO) Take by mouth 2 times daily (with meals)       Bilberry, Vaccinium myrtillus, (BILBERRY PO) Take by mouth daily       LUTEIN PO Take by mouth daily       predniSONE (DELTASONE) 5 MG tablet Take 1 tablet (5 mg) by mouth daily for 7 days 7 tablet 0     metFORMIN (GLUCOPHAGE) 500 MG tablet Take 1 tablet (500 mg) by mouth 3 times daily (with meals) . Need appointment for further refill 270 tablet 1     pravastatin (PRAVACHOL) 20 MG tablet TAKE 0.5 TABLET BY MOUTH DAILY 90 tablet 1     terazosin (HYTRIN) 5 MG capsule TAKE ONE CAPSULE BY MOUTH NIGHTLY AT BEDTIME 90 capsule 1     lisinopril (PRINIVIL/ZESTRIL) 5 MG tablet Take 1 tablet (5 mg) by mouth daily 90 tablet 1     Calcium Carbonate-Vitamin D (CALCIUM + D PO) Take  by mouth.       Cholecalciferol (VITAMIN D3) 3000 UNIT TABS Take  by mouth.       ASPIRIN 81 MG OR TABS 1 tab po QD (Once per day)       MULTIVITAMIN TABS   OR 1 DAILY       Allergies   Allergen Reactions     No Known Drug Allergies      Recent Labs   Lab Test  12/01/17   1547  10/11/17   1136  03/17/17   0942  09/29/16   0939  04/21/16   1107  03/18/16   1004   03/16/15   1112   03/18/13   1035   A1C   --   6.3*  6.3*  5.9   --   6.1*   < >  6.4*   < >  5.5   LDL   --    --   78   --    --   85   --   52   < >  98   HDL   --    --   42   --    --   42   --   48   < >  40   TRIG   --    --   141   --    --   97   --   85   < >  91   ALT   --   48  42   --   31  32   --   41   --   28   CR  1.16  1.17  1.07  1.01  1.09  1.06   < >  1.04   < >  1.07   GFRESTIMATED  61  60*  67  72  66  68   < >  70   < >  68   GFRESTBLACK  74  73  81  87  80  82   < >  84   < >  82  "  POTASSIUM  4.2  4.6  4.5  4.3  4.2  4.4   < >  3.9   < >  4.8   TSH   --   2.13   --    --    --    --    --    --    --   1.73    < > = values in this interval not displayed.      BP Readings from Last 3 Encounters:   12/06/17 135/70   12/01/17 146/79   10/11/17 134/79    Wt Readings from Last 3 Encounters:   12/06/17 221 lb (100.2 kg)   12/01/17 215 lb (97.5 kg)   10/11/17 217 lb (98.4 kg)              Reviewed and updated as needed this visit by clinical staff       Reviewed and updated as needed this visit by Provider         ROS:  Constitutional, HEENT, cardiovascular, pulmonary, gi and gu systems are negative, except as otherwise noted.      OBJECTIVE:   /70 (Cuff Size: Adult Large)  Pulse 79  Temp 96.8  F (36  C) (Tympanic)  Resp 12  Ht 5' 7\" (1.702 m)  Wt 221 lb (100.2 kg)  SpO2 98%  BMI 34.61 kg/m2  Body mass index is 34.61 kg/(m^2).  GENERAL: healthy, alert and no distress  NECK: no adenopathy, no asymmetry, masses, or scars and thyroid normal to palpation  RESP: lungs clear to auscultation - no rales, rhonchi or wheezes  CV: regular rate and rhythm, normal S1 S2, no S3 or S4, no murmur, click or rub, no peripheral edema and peripheral pulses strong  ABDOMEN: soft, nontender, no hepatosplenomegaly, no masses and bowel sounds normal  MS: no gross musculoskeletal defects noted, no edema        ASSESSMENT/PLAN:   Vikash was seen today for er f/u.    Diagnoses and all orders for this visit:    Bell palsy  -     predniSONE (DELTASONE) 5 MG tablet; Take 1 tablet (5 mg) by mouth daily for 7 days    Type 2 diabetes mellitus without complication, unspecified long term insulin use status (H)  -     predniSONE (DELTASONE) 5 MG tablet; Take 1 tablet (5 mg) by mouth daily for 7 days      Bell's palsy on right face, has no worsening, will have him to take short course of prednisone with close monitoring on BS due to DM      Hamilton Romo MD  [facility]  "

## 2017-12-06 NOTE — MR AVS SNAPSHOT
After Visit Summary   12/6/2017    Vikash Arredondo II    MRN: [id number]           Patient Information     Date Of Birth          1940        Visit Information        Provider Department      12/6/2017 1:20 PM Hamilton Romo MD [facility]        Today's Diagnoses     Bell palsy    -  1    Type 2 diabetes mellitus without complication, unspecified long term insulin use status (H)           Follow-ups after your visit        Follow-up notes from your care team     Return in about 4 months (around 4/6/2018).      Who to contact     If you have questions or need follow up information about today's clinic visit or your schedule please contact [facility] directly at [phone].  Normal or non-critical lab and imaging results will be communicated to you by MyChart, letter or phone within 4 business days after the clinic has received the results. If you do not hear from us within 7 days, please contact the clinic through ThingMagichart or phone. If you have a critical or abnormal lab result, we will notify you by phone as soon as possible.  Submit refill requests through Qosmos or call your pharmacy and they will forward the refill request to us. Please allow 3 business days for your refill to be completed.          Additional Information About Your Visit        MyChart Information     Qosmos gives you secure access to your electronic health record. If you see a primary care provider, you can also send messages to your care team and make appointments. If you have questions, please call your primary care clinic.  If you do not have a primary care provider, please call [phone] and they will assist you.        Care EveryWhere ID     This is your Care EveryWhere ID. This could be used by other organizations to access your [facility] medical records  [id number]        Your Vitals Were     Pulse Temperature Respirations Height Pulse Oximetry BMI (Body Mass Index)    79  "96.8  F (36  C) (Tympanic) 12 5' 7\" (1.702 m) 98% 34.61 kg/m2       Blood Pressure from Last 3 Encounters:   12/06/17 135/70   12/01/17 146/79   10/11/17 134/79    Weight from Last 3 Encounters:   12/06/17 221 lb (100.2 kg)   12/01/17 215 lb (97.5 kg)   10/11/17 217 lb (98.4 kg)              Today, you had the following     No orders found for display         Today's Medication Changes          These changes are accurate as of: 12/6/17  1:54 PM.  If you have any questions, ask your nurse or doctor.               Start taking these medicines.        Dose/Directions    predniSONE 5 MG tablet   Commonly known as:  DELTASONE   Used for:  Bell palsy, Type 2 diabetes mellitus without complication, unspecified long term insulin use status (H)   Started by:  Hamilton Romo MD        Dose:  5 mg   Take 1 tablet (5 mg) by mouth daily for 7 days   Quantity:  7 tablet   Refills:  0            Where to get your medicines      These medications were sent to [place] [address] [place] [facility] [address]  [address] [facility] [address]     Phone:  [phone]     predniSONE 5 MG tablet                Primary Care Provider Office Phone # Fax #    Hamilton Romo [phone] [address] [facility] [address]        Equal Access to Services     [facility] AH: Hadii aad ku hadasho Soomaali, waaxda luqadaha, qaybta kaalmada adeegyada, waxay rachana godwin ah. So [facility] [phone].    ATENCIÓN: Si habla español, tiene a shaver disposición servicios gratuitos de asistencia lingüística. Llame al [phone].    We comply with applicable federal civil rights laws and [place] laws. We do not discriminate on the basis of race, color, national origin, age, disability, sex, sexual orientation, or gender identity.            Thank you!     Thank you for choosing [facility]  for your care. Our goal is always to provide you with excellent care. " Hearing back from our patients is one way we can continue to improve our services. Please take a few minutes to complete the written survey that you may receive in the mail after your visit with us. Thank you!             Your Updated Medication List - Protect others around you: Learn how to safely use, store and throw away your medicines at www.disposemymeds.org.          This list is accurate as of: 12/6/17  1:54 PM.  Always use your most recent med list.                   Brand Name Dispense Instructions for use Diagnosis    ALEVE PO      Take by mouth 2 times daily (with meals)        aspirin 81 MG tablet      1 tab po QD (Once per day)    Type II or unspecified type diabetes mellitus without mention of complication, not stated as uncontrolled, Other and unspecified hyperlipidemia       BILBERRY PO      Take by mouth daily        CALCIUM + D PO      Take  by mouth.        IBUPROFEN PO      Take by mouth every 4 hours as needed for moderate pain        lisinopril 5 MG tablet    PRINIVIL/ZESTRIL    90 tablet    Take 1 tablet (5 mg) by mouth daily    Microalbuminuria       LUTEIN PO      Take by mouth daily        metFORMIN 500 MG tablet    GLUCOPHAGE    270 tablet    Take 1 tablet (500 mg) by mouth 3 times daily (with meals) . Need appointment for further refill    Type 2 diabetes mellitus without complication, unspecified long term insulin use status (H)       MULTIVITAMIN TABS   OR      1 DAILY        pravastatin 20 MG tablet    PRAVACHOL    90 tablet    TAKE 0.5 TABLET BY MOUTH DAILY    Type 2 diabetes mellitus without complication, unspecified long term insulin use status (H), Mixed hyperlipidemia       predniSONE 5 MG tablet    DELTASONE    7 tablet    Take 1 tablet (5 mg) by mouth daily for 7 days    Bell palsy, Type 2 diabetes mellitus without complication, unspecified long term insulin use status (H)       terazosin 5 MG capsule    HYTRIN    90 capsule    TAKE ONE CAPSULE BY MOUTH NIGHTLY AT BEDTIME     Benign non-nodular prostatic hyperplasia without lower urinary tract symptoms       vitamin B complex with vitamin C Tabs tablet      Take 1 tablet by mouth daily        Vitamin D3 3000 UNITS Tabs      Take  by mouth.

## 2017-12-06 NOTE — NURSING NOTE
"Chief Complaint   Patient presents with     ER F/U       Initial /70 (Cuff Size: Adult Large)  Pulse 79  Temp 96.8  F (36  C) (Tympanic)  Resp 12  Ht 5' 7\" (1.702 m)  Wt 221 lb (100.2 kg)  SpO2 98%  BMI 34.61 kg/m2 Estimated body mass index is 34.61 kg/(m^2) as calculated from the following:    Height as of this encounter: 5' 7\" (1.702 m).    Weight as of this encounter: 221 lb (100.2 kg).  Medication Reconciliation: complete   Mary Hussein, CMA  "

## 2018-03-01 ENCOUNTER — TRANSFERRED RECORDS (OUTPATIENT)
Dept: HEALTH INFORMATION MANAGEMENT | Facility: CLINIC | Age: 78
End: 2018-03-01

## 2018-03-22 ENCOUNTER — OFFICE VISIT (OUTPATIENT)
Dept: FAMILY MEDICINE | Facility: CLINIC | Age: 78
End: 2018-03-22
Payer: MEDICARE

## 2018-03-22 VITALS
SYSTOLIC BLOOD PRESSURE: 136 MMHG | HEART RATE: 84 BPM | WEIGHT: 216 LBS | BODY MASS INDEX: 33.83 KG/M2 | DIASTOLIC BLOOD PRESSURE: 64 MMHG | RESPIRATION RATE: 16 BRPM | TEMPERATURE: 95.7 F

## 2018-03-22 DIAGNOSIS — Z13.6 CARDIOVASCULAR SCREENING; LDL GOAL LESS THAN 100: ICD-10-CM

## 2018-03-22 DIAGNOSIS — D64.9 NORMOCYTIC NORMOCHROMIC ANEMIA: ICD-10-CM

## 2018-03-22 DIAGNOSIS — E11.9 TYPE 2 DIABETES MELLITUS WITHOUT COMPLICATION, UNSPECIFIED LONG TERM INSULIN USE STATUS: ICD-10-CM

## 2018-03-22 DIAGNOSIS — H53.8 BLURRED VISION: ICD-10-CM

## 2018-03-22 DIAGNOSIS — Z01.818 PREOP GENERAL PHYSICAL EXAM: Primary | ICD-10-CM

## 2018-03-22 DIAGNOSIS — H26.9 CATARACT OF BOTH EYES, UNSPECIFIED CATARACT TYPE: ICD-10-CM

## 2018-03-22 LAB
HBA1C MFR BLD: 6 % (ref 4.3–6)
HGB BLD-MCNC: 12.8 G/DL (ref 13.3–17.7)

## 2018-03-22 PROCEDURE — 83036 HEMOGLOBIN GLYCOSYLATED A1C: CPT | Performed by: FAMILY MEDICINE

## 2018-03-22 PROCEDURE — 85018 HEMOGLOBIN: CPT | Performed by: FAMILY MEDICINE

## 2018-03-22 PROCEDURE — 36415 COLL VENOUS BLD VENIPUNCTURE: CPT | Performed by: FAMILY MEDICINE

## 2018-03-22 PROCEDURE — 99214 OFFICE O/P EST MOD 30 MIN: CPT | Performed by: FAMILY MEDICINE

## 2018-03-22 NOTE — MR AVS SNAPSHOT
After Visit Summary   3/22/2018    Vikash Arredondo II    MRN: [id number]           Patient Information     Date Of Birth          1940        Visit Information        Provider Department      3/22/2018 10:20 AM Jordy Schaeffer MD [facility]        Today's Diagnoses     Preop general physical exam    -  1    Type 2 diabetes mellitus without complication, unspecified long term insulin use status (H)        CARDIOVASCULAR SCREENING; LDL GOAL LESS THAN 100        Normocytic normochromic anemia        Blurred vision        Cataract of both eyes, unspecified cataract type          Care Instructions      Before Your Surgery      Call your surgeon if there is any change in your health. This includes signs of a cold or flu (such as a sore throat, runny nose, cough, rash or fever).    Do not smoke, drink alcohol or take over the counter medicine (unless your surgeon or primary care doctor tells you to) for the 24 hours before and after surgery.    If you take prescribed drugs: Follow your doctor s orders about which medicines to take and which to stop until after surgery.    Eating and drinking prior to surgery: follow the instructions from your surgeon    Take a shower or bath the night before surgery. Use the soap your surgeon gave you to gently clean your skin. If you do not have soap from your surgeon, use your regular soap. Do not shave or scrub the surgery site.  Wear clean pajamas and have clean sheets on your bed.           Follow-ups after your visit        Follow-up notes from your care team     Return in about 3 months (around 6/22/2018) for if symptom does not get better.      Your next 10 appointments already scheduled     Apr 03, 2018 10:00 AM CDT   Office Visit with Hamilton Romo MD   [facility] ([facility]    [address] [place] [id number] [phone]           Bring a current list of meds and any records  pertaining to this visit. For Physicals, please bring immunization records and any forms needing to be filled out. Please arrive 10 minutes early to complete paperwork.              Who to contact     If you have questions or need follow up information about today's clinic visit or your schedule please contact [facility] directly at [phone].  Normal or non-critical lab and imaging results will be communicated to you by MyChart, letter or phone within 4 business days after the clinic has received the results. If you do not hear from us within 7 days, please contact the clinic through LensVectorhart or phone. If you have a critical or abnormal lab result, we will notify you by phone as soon as possible.  Submit refill requests through Great Dream or call your pharmacy and they will forward the refill request to us. Please allow 3 business days for your refill to be completed.          Additional Information About Your Visit        MyChart Information     Great Dream gives you secure access to your electronic health record. If you see a primary care provider, you can also send messages to your care team and make appointments. If you have questions, please call your primary care clinic.  If you do not have a primary care provider, please call [phone] and they will assist you.        Care EveryWhere ID     This is your Care EveryWhere ID. This could be used by other organizations to access your [place] medical records  [id number]        Your Vitals Were     Pulse Temperature Respirations BMI (Body Mass Index)          84 95.7  F (35.4  C) (Tympanic) 16 33.83 kg/m2         Blood Pressure from Last 3 Encounters:   03/22/18 136/64   12/06/17 135/70   12/01/17 146/79    Weight from Last 3 Encounters:   03/22/18 216 lb (98 kg)   12/06/17 221 lb (100.2 kg)   12/01/17 215 lb (97.5 kg)              We Performed the Following     Hemoglobin A1c     Hemoglobin        Primary Care Provider Office Phone # Fax #     Hamilton Romo [phone]       [address] [place], [place] [address]EN [facility] [address]        Equal Access to Services     LALIT ORTEZ : Hadii aad ku hadshirazbela Baig, johanneosiris rodriguezkrisitnaha, nadine kajacobda francisco, tarik purain hayaan ruiemerald case citlali hinojosa. So [facility] [phone].    ATENCIÓN: Si habla español, tiene a shaver disposición servicios gratuitos de asistencia lingüística. Llame al [phone].    We comply with applicable federal civil rights laws and [place] laws. We do not discriminate on the basis of race, color, national origin, age, disability, sex, sexual orientation, or gender identity.            Thank you!     [facility]. Our goal is always to provide you with excellent care. Hearing back from our patients is one way we can continue to improve our services. Please take a few minutes to complete the written survey that you may receive in the mail after your visit with us. Thank you!             Your Updated Medication List - Protect others around you: Learn how to safely use, store and throw away your medicines at www.disposemymeds.org.          This list is accurate as of 3/22/18 11:06 AM.  Always use your most recent med list.                   Brand Name Dispense Instructions for use Diagnosis    ALEVE PO      Take by mouth 2 times daily (with meals)        aspirin 81 MG tablet      1 tab po QD (Once per day)    Type II or unspecified type diabetes mellitus without mention of complication, not stated as uncontrolled, Other and unspecified hyperlipidemia       BILBERRY PO      Take by mouth daily        CALCIUM + D PO      Take  by mouth.        IBUPROFEN PO      Take by mouth every 4 hours as needed for moderate pain        lisinopril 5 MG tablet    PRINIVIL/ZESTRIL    90 tablet    Take 1 tablet (5 mg) by mouth daily    Microalbuminuria       LUTEIN PO      Take by mouth daily        metFORMIN 500 MG tablet    GLUCOPHAGE    270 tablet     Take 1 tablet (500 mg) by mouth 3 times daily (with meals) . Need appointment for further refill    Type 2 diabetes mellitus without complication, unspecified long term insulin use status (H)       MULTIVITAMIN TABS   OR      1 DAILY        pravastatin 20 MG tablet    PRAVACHOL    90 tablet    TAKE 0.5 TABLET BY MOUTH DAILY    Type 2 diabetes mellitus without complication, unspecified long term insulin use status (H), Mixed hyperlipidemia       terazosin 5 MG capsule    HYTRIN    90 capsule    TAKE ONE CAPSULE BY MOUTH NIGHTLY AT BEDTIME    Benign non-nodular prostatic hyperplasia without lower urinary tract symptoms       vitamin B complex with vitamin C Tabs tablet      Take 1 tablet by mouth daily        Vitamin D3 3000 UNITS Tabs      Take  by mouth.

## 2018-03-22 NOTE — PROGRESS NOTES
[facility] [place]  [address] [facility] [address]  [phone]  Dept: [phone]    PRE-OP EVALUATION:  Today's date: 3/22/2018    Vikash Arredondo II (: 1940) presents for pre-operative evaluation assessment as requested by Dr. Young.  He requires evaluation and anesthesia risk assessment prior to undergoing surgery/procedure for treatment of right eye catartact    Proposed Surgery/ Procedure: YAG Laser  Date of Surgery/ Procedure: 3/28/2018  Time of Surgery/ Procedure: 11am  Hospital/Surgical Facility: [facility]  Fax number for surgical facility: [phone]  Primary Physician: Hamilton Romo  Type of Anesthesia Anticipated: to be determined    Patient has a Health Care Directive or Living Will:  YES     1. NO - Do you have a history of heart attack, stroke, stent, bypass or surgery on an artery in the head, neck, heart or legs?  2. NO - Do you ever have any pain or discomfort in your chest?  3. NO - Do you have a history of  Heart Failure?  4. NO - Are you troubled by shortness of breath when: walking on the level, up a slight hill or at night?  5. NO - Do you currently have a cold, bronchitis or other respiratory infection?  6. NO - Do you have a cough, shortness of breath or wheezing?  7. NO - Do you sometimes get pains in the calves of your legs when you walk?  8. NO - Do you or anyone in your family have previous history of blood clots?  9. NO - Do you or does anyone in your family have a serious bleeding problem such as prolonged bleeding following surgeries or cuts?  10. NO - Have you ever had problems with anemia or been told to take iron pills?  11. NO - Have you had any abnormal blood loss such as black, tarry or bloody stools, or abnormal vaginal bleeding?  12. NO - Have you ever had a blood transfusion?  13. NO - Have you or any of your relatives ever had problems with anesthesia?  14. NO - Do you have sleep apnea, excessive snoring or  daytime drowsiness?  15. NO - Do you have any prosthetic heart valves?  16. NO - Do you have prosthetic joints?  17. NO - Is there any chance that you may be pregnant?      HPI:     HPI related to upcoming procedure:       See problem list for active medical problems.  Problems all longstanding and stable, except as noted/documented.  See ROS for pertinent symptoms related to these conditions.                                                                                                  .    MEDICAL HISTORY:     Patient Active Problem List    Diagnosis Date Noted     CARDIOVASCULAR SCREENING; LDL GOAL LESS THAN 100 10/31/2010     Priority: High     Normocytic normochromic anemia 05/26/2016     Priority: Medium     Obesity (BMI 30.0-34.9) 10/30/2015     Priority: Medium     Type 2 diabetes mellitus without complications (H) 10/30/2015     Priority: Medium     Advanced directives, counseling/discussion 11/10/2011     Priority: Low     Advance Directive Problem List Overview:   Name Relationship Phone    Primary Health Care Agent Lory Arredondo Friend [phone]         Alternative Health Care Agent      11/14/11 Received Health Care Directive from outside source.  Click on pt header to view document. Celina Yeung RN     Patient states has Advance Directive and will bring in a copy to clinic. 11/10/2011          BPH (benign prostatic hyperplasia) 10/30/2009     Priority: Low      Past Medical History:   Diagnosis Date     Cataract 7/5/2010    Both eyes     type 2 diabetes      Past Surgical History:   Procedure Laterality Date     C LAMINOTOMY,LUMBAR DISK,1 INTRSP  1999     COLONOSCOPY  2/2002    Normal     COLONOSCOPY  8/11/07    Diverticulosis; repeat in 10 years     EXTRACAPSULAR CATARACT EXTRATION WITH INTRAOCULAR LENS IMPLANT  7/2010    right eye     EXTRACAPSULAR CATARACT EXTRATION WITH INTRAOCULAR LENS IMPLANT  11/2010    left eye     HC REPAIR INCISIONAL HERNIA,REDUCIBLE  1986    Hernia Repair,  Incisional, Unilateral     HC REPAIR OF NASAL SEPTUM  2002     HC REPAIR ROTATOR CUFF,ACUTE  8/2008    left shoulder     HC VASECTOMY UNILAT/BILAT W POSTOP SEMEN  1974    Vasectomy     LAPAROSCOPIC HERNIORRHAPHY VENTRAL  1/23/2013    Procedure: LAPAROSCOPIC HERNIORRHAPHY VENTRAL;  LAPAROSCOPIC VENTRAL  RIGHT HERNIA REPAIR WITH MESH;  Surgeon: Jeanmarie Hutchison MD;  Location: [facility]     Current Outpatient Prescriptions   Medication Sig Dispense Refill     vitamin B complex with vitamin C (VITAMIN  B COMPLEX) TABS tablet Take 1 tablet by mouth daily       Bilberry, Vaccinium myrtillus, (BILBERRY PO) Take by mouth daily       LUTEIN PO Take by mouth daily       metFORMIN (GLUCOPHAGE) 500 MG tablet Take 1 tablet (500 mg) by mouth 3 times daily (with meals) . Need appointment for further refill 270 tablet 1     pravastatin (PRAVACHOL) 20 MG tablet TAKE 0.5 TABLET BY MOUTH DAILY 90 tablet 1     terazosin (HYTRIN) 5 MG capsule TAKE ONE CAPSULE BY MOUTH NIGHTLY AT BEDTIME 90 capsule 1     lisinopril (PRINIVIL/ZESTRIL) 5 MG tablet Take 1 tablet (5 mg) by mouth daily 90 tablet 1     Calcium Carbonate-Vitamin D (CALCIUM + D PO) Take  by mouth.       Cholecalciferol (VITAMIN D3) 3000 UNIT TABS Take  by mouth.       ASPIRIN 81 MG OR TABS 1 tab po QD (Once per day)       MULTIVITAMIN TABS   OR 1 DAILY       IBUPROFEN PO Take by mouth every 4 hours as needed for moderate pain       Naproxen Sodium (ALEVE PO) Take by mouth 2 times daily (with meals)       OTC products: None, except as noted above    Allergies   Allergen Reactions     No Known Drug Allergies       Latex Allergy: NO    Social History   Substance Use Topics     Smoking status: Former Smoker     Packs/day: 0.50     Years: 1.00     Types: Cigarettes     Quit date: 1/2/1961     Smokeless tobacco: Never Used     Alcohol use No     History   Drug Use No       REVIEW OF SYSTEMS:   CONSTITUTIONAL: NEGATIVE for fever, chills, change in weight  ENT/MOUTH: NEGATIVE for ear,  mouth and throat problems  RESP: NEGATIVE for significant cough or SOB  CV: NEGATIVE for chest pain, palpitations or peripheral edema    EXAM:   /64  Pulse 84  Temp 95.7  F (35.4  C) (Tympanic)  Resp 16  Wt 216 lb (98 kg)  BMI 33.83 kg/m2  GENERAL APPEARANCE: healthy, alert and no distress  HENT: ear canals and TM's normal and nose and mouth without ulcers or lesions  RESP: lungs clear to auscultation - no rales, rhonchi or wheezes  CV: regular rate and rhythm, normal S1 S2, no S3 or S4 and no murmur, click or rub   ABDOMEN: soft, nontender, no HSM or masses and bowel sounds normal  NEURO: Normal strength and tone, sensory exam grossly normal, mentation intact and speech normal    DIAGNOSTICS:     Results for orders placed or performed in visit on 03/22/18   Hemoglobin   Result Value Ref Range    Hemoglobin 12.8 (L) 13.3 - 17.7 g/dL   Hemoglobin A1c   Result Value Ref Range    Hemoglobin A1C 6.0 4.3 - 6.0 %         Recent Labs   Lab Test  12/01/17   1547  10/11/17   1136  03/17/17   0942   HGB  13.2*   --   13.3   PLT  180   --   203   NA  137  138  138   POTASSIUM  4.2  4.6  4.5   CR  1.16  1.17  1.07   A1C   --   6.3*  6.3*        IMPRESSION:   Reason for surgery/procedure: blurry vision      Diagnosis/reason for consult:   1. Preop general physical exam    2. Type 2 diabetes mellitus without complication, unspecified long term insulin use status (H)    3. CARDIOVASCULAR SCREENING; LDL GOAL LESS THAN 100    4. Normocytic normochromic anemia    5. Blurred vision    6. Cataract of both eyes, unspecified cataract type          The proposed surgical procedure is considered LOW risk.    REVISED CARDIAC RISK INDEX  The patient has the following serious cardiovascular risks for perioperative complications such as (MI, PE, VFib and 3  AV Block):  No serious cardiac risks  INTERPRETATION: 0 risks: Class I (very low risk - 0.4% complication rate)    The patient has the following additional risks for perioperative  complications:  No identified additional risks      ICD-10-CM    1. Preop general physical exam Z01.818 Hemoglobin     Hemoglobin A1c   2. Type 2 diabetes mellitus without complication, unspecified long term insulin use status (H) E11.9 Hemoglobin     Hemoglobin A1c   3. CARDIOVASCULAR SCREENING; LDL GOAL LESS THAN 100 Z13.6    4. Normocytic normochromic anemia D64.9 Hemoglobin   5. Blurred vision H53.8    6. Cataract of both eyes, unspecified cataract type H26.9        RECOMMENDATIONS:     APPROVAL GIVEN to proceed with proposed procedure, without further diagnostic evaluation       Signed Electronically by: Jordy Schaeffer MD    Copy of this evaluation report is provided to requesting physician.    [facility] Preop Guidelines

## 2018-04-03 ENCOUNTER — OFFICE VISIT (OUTPATIENT)
Dept: FAMILY MEDICINE | Facility: CLINIC | Age: 78
End: 2018-04-03
Payer: MEDICARE

## 2018-04-03 VITALS
SYSTOLIC BLOOD PRESSURE: 133 MMHG | HEART RATE: 86 BPM | OXYGEN SATURATION: 98 % | WEIGHT: 216 LBS | DIASTOLIC BLOOD PRESSURE: 74 MMHG | BODY MASS INDEX: 33.9 KG/M2 | TEMPERATURE: 98.2 F | HEIGHT: 67 IN | RESPIRATION RATE: 16 BRPM

## 2018-04-03 DIAGNOSIS — E66.811 OBESITY (BMI 30.0-34.9): ICD-10-CM

## 2018-04-03 DIAGNOSIS — N40.1 BENIGN PROSTATIC HYPERPLASIA WITH LOWER URINARY TRACT SYMPTOMS, SYMPTOM DETAILS UNSPECIFIED: ICD-10-CM

## 2018-04-03 DIAGNOSIS — E11.9 TYPE 2 DIABETES MELLITUS WITHOUT COMPLICATION, UNSPECIFIED LONG TERM INSULIN USE STATUS: Primary | ICD-10-CM

## 2018-04-03 PROCEDURE — 99214 OFFICE O/P EST MOD 30 MIN: CPT | Performed by: FAMILY MEDICINE

## 2018-04-03 NOTE — PROGRESS NOTES
SUBJECTIVE:   Vikash Arredondo II is a 77 year old male who presents to clinic today for the following health issues:      Forms        Description (location/character/radiation): Pt needs FAA form filled out.          Problem list and histories reviewed & adjusted, as indicated.  Additional history: as documented    Patient Active Problem List   Diagnosis     BPH (benign prostatic hyperplasia)     CARDIOVASCULAR SCREENING; LDL GOAL LESS THAN 100     Advanced directives, counseling/discussion     Obesity (BMI 30.0-34.9)     Type 2 diabetes mellitus without complications (H)     Normocytic normochromic anemia     Past Surgical History:   Procedure Laterality Date     C LAMINOTOMY,LUMBAR DISK,1 INTRSP  1999     COLONOSCOPY  2/2002    Normal     COLONOSCOPY  8/11/07    Diverticulosis; repeat in 10 years     EXTRACAPSULAR CATARACT EXTRATION WITH INTRAOCULAR LENS IMPLANT  7/2010    right eye     EXTRACAPSULAR CATARACT EXTRATION WITH INTRAOCULAR LENS IMPLANT  11/2010    left eye     HC REPAIR INCISIONAL HERNIA,REDUCIBLE  1986    Hernia Repair, Incisional, Unilateral     HC REPAIR OF NASAL SEPTUM  2002     HC REPAIR ROTATOR CUFF,ACUTE  8/2008    left shoulder     HC VASECTOMY UNILAT/BILAT W POSTOP SEMEN  1974    Vasectomy     LAPAROSCOPIC HERNIORRHAPHY VENTRAL  1/23/2013    Procedure: LAPAROSCOPIC HERNIORRHAPHY VENTRAL;  LAPAROSCOPIC VENTRAL  RIGHT HERNIA REPAIR WITH MESH;  Surgeon: Jeanmarie Hutchison MD;  Location: [facility]       Social History   Substance Use Topics     Smoking status: Former Smoker     Packs/day: 0.50     Years: 1.00     Types: Cigarettes     Quit date: 1/2/1961     Smokeless tobacco: Never Used     Alcohol use No     Family History   Problem Relation Age of Onset     Cancer - colorectal Paternal Uncle      C.A.D. Paternal Uncle      Anesthesia Reaction No family hx of      Blood Disease No family hx of      Thyroid Disease No family hx of      Eye Disorder No family hx of      Hypertension No  family hx of          Current Outpatient Prescriptions   Medication Sig Dispense Refill     terazosin (HYTRIN) 5 MG capsule TAKE 1 CAPSULE BY MOUTH NIGHTLY AT BEDTIME 90 capsule 1     IBUPROFEN PO Take by mouth every 4 hours as needed for moderate pain       vitamin B complex with vitamin C (VITAMIN  B COMPLEX) TABS tablet Take 1 tablet by mouth daily       Naproxen Sodium (ALEVE PO) Take by mouth 2 times daily (with meals)       Bilberry, Vaccinium myrtillus, (BILBERRY PO) Take by mouth daily       LUTEIN PO Take by mouth daily       metFORMIN (GLUCOPHAGE) 500 MG tablet Take 1 tablet (500 mg) by mouth 3 times daily (with meals) . Need appointment for further refill 270 tablet 1     pravastatin (PRAVACHOL) 20 MG tablet TAKE 0.5 TABLET BY MOUTH DAILY 90 tablet 1     lisinopril (PRINIVIL/ZESTRIL) 5 MG tablet Take 1 tablet (5 mg) by mouth daily 90 tablet 1     Calcium Carbonate-Vitamin D (CALCIUM + D PO) Take  by mouth.       Cholecalciferol (VITAMIN D3) 3000 UNIT TABS Take  by mouth.       ASPIRIN 81 MG OR TABS 1 tab po QD (Once per day)       MULTIVITAMIN TABS   OR 1 DAILY       Allergies   Allergen Reactions     No Known Drug Allergies      Recent Labs   Lab Test  03/22/18   1044  12/01/17   1547  10/11/17   1136  03/17/17   0942   04/21/16   1107  03/18/16   1004   03/16/15   1112   03/18/13   1035   A1C  6.0   --   6.3*  6.3*   < >   --   6.1*   < >  6.4*   < >  5.5   LDL   --    --    --   78   --    --   85   --   52   < >  98   HDL   --    --    --   42   --    --   42   --   48   < >  40   TRIG   --    --    --   141   --    --   97   --   85   < >  91   ALT   --    --   48  42   --   31  32   --   41   --   28   CR   --   1.16  1.17  1.07   < >  1.09  1.06   < >  1.04   < >  1.07   GFRESTIMATED   --   61  60*  67   < >  66  68   < >  70   < >  68   GFRESTBLACK   --   74  73  81   < >  80  82   < >  84   < >  82   POTASSIUM   --   4.2  4.6  4.5   < >  4.2  4.4   < >  3.9   < >  4.8   TSH   --    --   2.13   --  "   --    --    --    --    --    --   1.73    < > = values in this interval not displayed.      BP Readings from Last 3 Encounters:   04/03/18 133/74   03/22/18 136/64   12/06/17 135/70    Wt Readings from Last 3 Encounters:   04/03/18 216 lb (98 kg)   03/22/18 216 lb (98 kg)   12/06/17 221 lb (100.2 kg)                    Reviewed and updated as needed this visit by clinical staff  Allergies       Reviewed and updated as needed this visit by Provider         ROS:  Constitutional, HEENT, cardiovascular, pulmonary, gi and gu systems are negative, except as otherwise noted.    OBJECTIVE:     /74 (Cuff Size: Adult Large)  Pulse 86  Temp 98.2  F (36.8  C) (Tympanic)  Resp 16  Ht 5' 7\" (1.702 m)  Wt 216 lb (98 kg)  SpO2 98%  BMI 33.83 kg/m2  Body mass index is 33.83 kg/(m^2).  GENERAL: healthy, alert and no distress  NECK: no adenopathy, no asymmetry, masses, or scars and thyroid normal to palpation  RESP: lungs clear to auscultation - no rales, rhonchi or wheezes  CV: regular rate and rhythm, normal S1 S2, no S3 or S4, no murmur, click or rub, no peripheral edema and peripheral pulses strong  ABDOMEN: soft, nontender, no hepatosplenomegaly, no masses and bowel sounds normal  MS: no gross musculoskeletal defects noted, no edema        ASSESSMENT/PLAN:   (E11.9) Type 2 diabetes mellitus without complication, unspecified long term insulin use status (H)  (primary encounter diagnosis)  Comment: has been stable with current dose of meds, has no side effect from it, has no clinical sign of worsening   Plan: will keep him on the current dose     (E66.9) Obesity (BMI 30.0-34.9)  Comment: has no change,   Plan: encouraged him to keep working on life style modification for wt loss     (N40.1) Benign prostatic hyperplasia with lower urinary tract symptoms, symptom details unspecified  Comment: stable with current dose of Terazosin  Plan: will keep watching sx       FUTURE APPOINTMENTS:       - Follow-up visit in 6 " months     Hamilton Romo MD  [facility] [place]

## 2018-04-03 NOTE — LETTER
"[facility] [place]  [address] [address]  Phone: [phone]    April 4, 2018        Vikash Arredondo II  [address]   [facility] [address]      Ref: PI # [id number]          [facility] # [id number]                [facility] [place] # [id number]           To whom it may concern:       RE: Vikash Arredondo is followed at our clinic for diabetes. He has had good control with current regimen, and diabetic status is stable. He is currently on lisinopril for renal protection. His latest Hemoglobin A1C and urine microalbumin were within normal range. In general, his diabetes is well controlled so far.      I attached his recent date of vital sign and lab results, and these looked very stable on current regimen. He does not need to change in regimen.       Vital Signs 12/6/2017 3/22/2018 4/3/2018   Systolic 135 136 133   Diastolic 70 64 74   Pulse 79  86   Temperature 96.8  98.2   Respirations 12  16   Weight (LB) 221 lb  216 lb   Height 5' 7\"  5' 7\"   BMI (Calculated) 34.69  33.9   Pain      O2 98  98     Please contact me for questions or concerns.      Sincerely,        Hamilton Romo MD  "

## 2018-04-03 NOTE — NURSING NOTE
"Chief Complaint   Patient presents with     Forms       Initial /74 (Cuff Size: Adult Large)  Pulse 86  Temp 98.2  F (36.8  C) (Tympanic)  Resp 16  Ht 5' 7\" (1.702 m)  Wt 216 lb (98 kg)  SpO2 98%  BMI 33.83 kg/m2 Estimated body mass index is 33.83 kg/(m^2) as calculated from the following:    Height as of this encounter: 5' 7\" (1.702 m).    Weight as of this encounter: 216 lb (98 kg).  Medication Reconciliation: complete   Mary Hussein, CMA'  "

## 2018-04-21 DIAGNOSIS — E11.9 TYPE 2 DIABETES MELLITUS WITHOUT COMPLICATION, UNSPECIFIED LONG TERM INSULIN USE STATUS: ICD-10-CM

## 2018-04-23 NOTE — TELEPHONE ENCOUNTER
Routing refill request to provider for review/approval because:  Labs not current:  ldl    Saray HernandezRN BSN  [facility]  [phone]

## 2018-04-23 NOTE — TELEPHONE ENCOUNTER
"Requested Prescriptions   Pending Prescriptions Disp Refills     metFORMIN (GLUCOPHAGE) 500 MG tablet [Pharmacy Med Name: METFORMIN  MG TABLET]  Last Written Prescription Date:  10/11/17  Last Fill Quantity: 270,  # refills: 1   Last office visit: 4/3/2018 with prescribing provider:  Demetrius   Future Office Visit:     270 tablet 1     Sig: TAKE 1 TABLET (500 MG) BY MOUTH 3 TIMES DAILY (WITH MEALS) . NEED APPOINTMENT FOR FURTHER REFILL    Biguanide Agents Failed    4/21/2018 11:57 AM       Failed - Patient has documented LDL within the past 12 mos.    Recent Labs   Lab Test  03/17/17   0942   LDL  78            Passed - Blood pressure less than 140/90 in past 6 months    BP Readings from Last 3 Encounters:   04/03/18 133/74   03/22/18 136/64   12/06/17 135/70                Passed - Patient has had a Microalbumin in the past 12 mos.    Recent Labs   Lab Test  07/13/17   1344   MICROL  19   UMALCR  15.16            Passed - Patient is age 10 or older       Passed - Patient has documented A1c within the specified period of time.    Recent Labs   Lab Test  03/22/18   1044   A1C  6.0            Passed - Patient's CR is NOT>1.4 OR Patient's EGFR is NOT<45 within past 12 mos.    Recent Labs   Lab Test  12/01/17   1547   GFRESTIMATED  61   GFRESTBLACK  74       Recent Labs   Lab Test  12/01/17   1547   CR  1.16            Passed - Patient does NOT have a diagnosis of CHF.       Passed - Recent (6 mo) or future (30 days) visit within the authorizing provider's specialty    Patient had office visit in the last 6 months or has a visit in the next 30 days with authorizing provider or within the authorizing provider's specialty.  See \"Patient Info\" tab in inbasket, or \"Choose Columns\" in Meds & Orders section of the refill encounter.              "

## 2018-06-18 DIAGNOSIS — R80.9 MICROALBUMINURIA: ICD-10-CM

## 2018-06-18 NOTE — TELEPHONE ENCOUNTER
"Requested Prescriptions   Pending Prescriptions Disp Refills     lisinopril (PRINIVIL/ZESTRIL) 5 MG tablet [Pharmacy Med Name: LISINOPRIL 5 MG TABLET]  Last Written Prescription Date:  10-  Last Fill Quantity: 90 tablet,  # refills: 1   Last office visit: 4/3/2018 with prescribing provider:  4-3-2018  Future Office Visit:     90 tablet 1     Sig: TAKE 1 TABLET BY MOUTH DAILY    ACE Inhibitors (Including Combos) Protocol Passed    6/18/2018  3:57 PM       Passed - Blood pressure under 140/90 in past 12 months    BP Readings from Last 3 Encounters:   04/03/18 133/74   03/22/18 136/64   12/06/17 135/70                Passed - Recent (12 mo) or future (30 days) visit within the authorizing provider's specialty    Patient had office visit in the last 12 months or has a visit in the next 30 days with authorizing provider or within the authorizing provider's specialty.  See \"Patient Info\" tab in inbasket, or \"Choose Columns\" in Meds & Orders section of the refill encounter.           Passed - Patient is age 18 or older       Passed - Normal serum creatinine on file in past 12 months    Recent Labs   Lab Test  12/01/17   1547   CR  1.16            Passed - Normal serum potassium on file in past 12 months    Recent Labs   Lab Test  12/01/17   1547   POTASSIUM  4.2               "

## 2018-06-19 RX ORDER — LISINOPRIL 5 MG/1
TABLET ORAL
Qty: 90 TABLET | Refills: 1 | Status: SHIPPED | OUTPATIENT
Start: 2018-06-19 | End: 2019-03-21

## 2018-10-15 ENCOUNTER — ALLIED HEALTH/NURSE VISIT (OUTPATIENT)
Dept: NURSING | Facility: CLINIC | Age: 78
End: 2018-10-15
Payer: MEDICARE

## 2018-10-15 DIAGNOSIS — Z23 NEED FOR PROPHYLACTIC VACCINATION AND INOCULATION AGAINST INFLUENZA: Primary | ICD-10-CM

## 2018-10-15 PROCEDURE — G0008 ADMIN INFLUENZA VIRUS VAC: HCPCS

## 2018-10-15 PROCEDURE — 99207 ZZC NO CHARGE NURSE ONLY: CPT

## 2018-10-15 PROCEDURE — 90662 IIV NO PRSV INCREASED AG IM: CPT

## 2018-10-15 NOTE — PROGRESS NOTES

## 2018-10-15 NOTE — MR AVS SNAPSHOT
After Visit Summary   10/15/2018    Vikash Arredondo II    MRN: [id number]           Patient Information     Date Of Birth          1940        Visit Information        Provider Department      10/15/2018 11:45 AM EC MA/LPN [facility] [place]        Today's Diagnoses     Need for prophylactic vaccination and inoculation against influenza    -  1       Follow-ups after your visit        Who to contact     If you have questions or need follow up information about today's clinic visit or your schedule please contact [facility] [place] directly at [phone].  Normal or non-critical lab and imaging results will be communicated to you by [email or web address], letter or phone within 4 business days after the clinic has received the results. If you do not hear from us within 7 days, please contact the clinic through Font or phone. If you have a critical or abnormal lab result, we will notify you by phone as soon as possible.  Submit refill requests through [employer] or call your pharmacy and they will forward the refill request to us. Please allow 3 business days for your refill to be completed.          Additional Information About Your Visit        MyChart Information     [employer] gives you secure access to your electronic health record. If you see a primary care provider, you can also send messages to your care team and make appointments. If you have questions, please call your primary care clinic.  If you do not have a primary care provider, please call [phone] and they will assist you.        Care EveryWhere ID     This is your Care EveryWhere ID. This could be used by other organizations to access your [facility] medical records  [id number]         Blood Pressure from Last 3 Encounters:   04/03/18 133/74   03/22/18 136/64   12/06/17 135/70    Weight from Last 3 Encounters:   04/03/18 216 lb (98 kg)   03/22/18 216 lb (98 kg)   12/06/17 221 lb (100.2 kg)              We Performed  the Following     FLU VACCINE, INCREASED ANTIGEN, PRESV FREE, AGE 65+ [61031]     Vaccine Administration, Initial [12605]        Primary Care Provider Office Phone # Fax #    Hamilton Romo [phone]        [facility] [address]        Equal Access to Services     LALIT ORTEZ : Hadii aad ku hadasho Soomaali, waaxda luqadaha, qaybta kaalmada adeegyada, waxay idiin hayaan adeemerald khchrissh laIleneaan . So [facility] [phone].    ATENCIÓN: Si habla español, tiene a shaver disposición servicios gratuitos de asistencia lingüística. Llame al [phone].    We comply with applicable federal civil rights laws and [place] laws. We do not discriminate on the basis of race, color, national origin, age, disability, sex, sexual orientation, or gender identity.            Thank you!     Thank you for choosing [facility]  for your care. Our goal is always to provide you with excellent care. Hearing back from our patients is one way we can continue to improve our services. Please take a few minutes to complete the written survey that you may receive in the mail after your visit with us. Thank you!             Your Updated Medication List - Protect others around you: Learn how to safely use, store and throw away your medicines at www.disposemymeds.org.          This list is accurate as of 10/15/18 11:55 AM.  Always use your most recent med list.                   Brand Name Dispense Instructions for use Diagnosis    ALEVE PO      Take by mouth 2 times daily (with meals)        aspirin 81 MG tablet      1 tab po QD (Once per day)    Type II or unspecified type diabetes mellitus without mention of complication, not stated as uncontrolled, Other and unspecified hyperlipidemia       BILBERRY PO      Take by mouth daily        CALCIUM + D PO      Take  by mouth.        IBUPROFEN PO      Take by mouth every 4 hours as needed for moderate pain        lisinopril 5 MG tablet     PRINIVIL/ZESTRIL    90 tablet    TAKE 1 TABLET BY MOUTH DAILY    Microalbuminuria       LUTEIN PO      Take by mouth daily        metFORMIN 500 MG tablet    GLUCOPHAGE    270 tablet    TAKE 1 TABLET (500 MG) BY MOUTH 3 TIMES DAILY (WITH MEALS) . NEED APPOINTMENT FOR FURTHER REFILL    Type 2 diabetes mellitus without complication, unspecified long term insulin use status       MULTIVITAMIN TABS   OR      1 DAILY        pravastatin 20 MG tablet    PRAVACHOL    90 tablet    TAKE 0.5 TABLET BY MOUTH DAILY    Type 2 diabetes mellitus without complication, unspecified long term insulin use status, Mixed hyperlipidemia       terazosin 5 MG capsule    HYTRIN    90 capsule    TAKE 1 CAPSULE BY MOUTH NIGHTLY AT BEDTIME    Benign non-nodular prostatic hyperplasia without lower urinary tract symptoms       vitamin B complex with vitamin C Tabs tablet      Take 1 tablet by mouth daily        Vitamin D3 3000 units Tabs      Take  by mouth.

## 2018-10-21 DIAGNOSIS — E11.9 TYPE 2 DIABETES MELLITUS WITHOUT COMPLICATION, WITHOUT LONG-TERM CURRENT USE OF INSULIN (H): Primary | ICD-10-CM

## 2018-10-21 DIAGNOSIS — N40.0 BENIGN NON-NODULAR PROSTATIC HYPERPLASIA WITHOUT LOWER URINARY TRACT SYMPTOMS: ICD-10-CM

## 2018-10-22 PROBLEM — E11.9 TYPE 2 DIABETES MELLITUS WITHOUT COMPLICATION, WITHOUT LONG-TERM CURRENT USE OF INSULIN (H): Status: ACTIVE | Noted: 2018-10-22

## 2018-10-22 RX ORDER — TERAZOSIN 5 MG/1
CAPSULE ORAL
Qty: 90 CAPSULE | Refills: 1 | Status: SHIPPED | OUTPATIENT
Start: 2018-10-22 | End: 2019-01-09

## 2018-10-22 NOTE — TELEPHONE ENCOUNTER
Attempted to approve 30 day supply but received message that diagnosis is not specific enough for billing.    Ronda GERMAN RN  EP Triage

## 2018-10-22 NOTE — TELEPHONE ENCOUNTER
"Requested Prescriptions   Pending Prescriptions Disp Refills     terazosin (HYTRIN) 5 MG capsule [Pharmacy Med Name: TERAZOSIN 5 MG CAPSULE]  Last Written Prescription Date:  3/30/18  Last Fill Quantity: 90,  # refills: 1   Last office visit: 4/3/2018 with prescribing provider:  Demetrius   Future Office Visit:     90 capsule 1     Sig: TAKE 1 CAPSULE BY MOUTH NIGHTLY AT BEDTIME    Alpha Blockers Passed    10/21/2018 11:50 AM       Passed - Blood pressure under 140/90 in past 12 months    BP Readings from Last 3 Encounters:   04/03/18 133/74   03/22/18 136/64   12/06/17 135/70                Passed - Recent (12 mo) or future (30 days) visit within the authorizing provider's specialty    Patient had office visit in the last 12 months or has a visit in the next 30 days with authorizing provider or within the authorizing provider's specialty.  See \"Patient Info\" tab in inbasket, or \"Choose Columns\" in Meds & Orders section of the refill encounter.             Passed - Patient does not have Tadalafil, Vardenafil, or Sildenafil on their medication list       Passed - Patient is 18 years of age or older        metFORMIN (GLUCOPHAGE) 500 MG tablet [Pharmacy Med Name: METFORMIN  MG TABLET]  Last Written Prescription Date:  4/23/18  Last Fill Quantity: 270,  # refills: 1   Last office visit: 4/3/2018 with prescribing provider:  demetrius   Future Office Visit:     270 tablet 1     Sig: TAKE 1 TABLET (500 MG) BY MOUTH 3 TIMES DAILY (WITH MEALS) . NEED APPOINTMENT FOR FURTHER REFILL    Biguanide Agents Failed    10/21/2018 11:50 AM       Failed - Blood pressure less than 140/90 in past 6 months    BP Readings from Last 3 Encounters:   04/03/18 133/74   03/22/18 136/64   12/06/17 135/70                Failed - Patient has documented LDL within the past 12 mos.    Recent Labs   Lab Test  03/17/17   0942   LDL  78            Failed - Patient has had a Microalbumin in the past 15 mos.    Recent Labs   Lab Test  07/13/17   1344 " "  MICROL  19   UMALCR  15.16            Failed - Patient has documented A1c within the specified period of time.    If HgbA1C is 8 or greater, it needs to be on file within the past 3 months.  If less than 8, must be on file within the past 6 months.     Recent Labs   Lab Test  03/22/18   1044   A1C  6.0            Failed - Recent (6 mo) or future (30 days) visit within the authorizing provider's specialty    Patient had office visit in the last 6 months or has a visit in the next 30 days with authorizing provider or within the authorizing provider's specialty.  See \"Patient Info\" tab in inbasket, or \"Choose Columns\" in Meds & Orders section of the refill encounter.           Passed - Patient is age 10 or older       Passed - Patient's CR is NOT>1.4 OR Patient's EGFR is NOT<45 within past 12 mos.    Recent Labs   Lab Test  12/01/17   1547   GFRESTIMATED  61   GFRESTBLACK  74       Recent Labs   Lab Test  12/01/17   1547   CR  1.16            Passed - Patient does NOT have a diagnosis of CHF.          "

## 2018-10-25 ENCOUNTER — OFFICE VISIT (OUTPATIENT)
Dept: FAMILY MEDICINE | Facility: CLINIC | Age: 78
End: 2018-10-25
Payer: MEDICARE

## 2018-10-25 VITALS
HEIGHT: 67 IN | DIASTOLIC BLOOD PRESSURE: 71 MMHG | BODY MASS INDEX: 34.06 KG/M2 | WEIGHT: 217 LBS | OXYGEN SATURATION: 99 % | SYSTOLIC BLOOD PRESSURE: 120 MMHG | TEMPERATURE: 95.9 F | HEART RATE: 72 BPM

## 2018-10-25 DIAGNOSIS — N40.1 BENIGN PROSTATIC HYPERPLASIA WITH LOWER URINARY TRACT SYMPTOMS, SYMPTOM DETAILS UNSPECIFIED: ICD-10-CM

## 2018-10-25 DIAGNOSIS — E11.9 TYPE 2 DIABETES MELLITUS WITHOUT COMPLICATION, UNSPECIFIED WHETHER LONG TERM INSULIN USE (H): Primary | ICD-10-CM

## 2018-10-25 DIAGNOSIS — Z12.5 SCREENING FOR PROSTATE CANCER: ICD-10-CM

## 2018-10-25 LAB
HBA1C MFR BLD: 6.2 % (ref 0–5.6)
HGB BLD-MCNC: 13.2 G/DL (ref 13.3–17.7)

## 2018-10-25 PROCEDURE — 83036 HEMOGLOBIN GLYCOSYLATED A1C: CPT | Performed by: FAMILY MEDICINE

## 2018-10-25 PROCEDURE — 82043 UR ALBUMIN QUANTITATIVE: CPT | Performed by: FAMILY MEDICINE

## 2018-10-25 PROCEDURE — 80061 LIPID PANEL: CPT | Performed by: FAMILY MEDICINE

## 2018-10-25 PROCEDURE — 80048 BASIC METABOLIC PNL TOTAL CA: CPT | Performed by: FAMILY MEDICINE

## 2018-10-25 PROCEDURE — 36415 COLL VENOUS BLD VENIPUNCTURE: CPT | Performed by: FAMILY MEDICINE

## 2018-10-25 PROCEDURE — G0103 PSA SCREENING: HCPCS | Performed by: FAMILY MEDICINE

## 2018-10-25 PROCEDURE — 85018 HEMOGLOBIN: CPT | Performed by: FAMILY MEDICINE

## 2018-10-25 PROCEDURE — 84460 ALANINE AMINO (ALT) (SGPT): CPT | Performed by: FAMILY MEDICINE

## 2018-10-25 PROCEDURE — 99214 OFFICE O/P EST MOD 30 MIN: CPT | Performed by: FAMILY MEDICINE

## 2018-10-25 NOTE — MR AVS SNAPSHOT
After Visit Summary   10/25/2018    Vikash Arredondo II    MRN: [id number]           Patient Information     Date Of Birth          1940        Visit Information        Provider Department      10/25/2018 11:00 AM Hamilton Romo MD [facility] [place]        Today's Diagnoses     Type 2 diabetes mellitus without complication, unspecified whether long term insulin use (H)    -  1    Benign prostatic hyperplasia with lower urinary tract symptoms, symptom details unspecified        Screening for prostate cancer           Follow-ups after your visit        Follow-up notes from your care team     Return in about 6 months (around 4/25/2019) for Physical Exam, DM recheck, Routine Visit, BP Recheck.      Who to contact     If you have questions or need follow up information about today's clinic visit or your schedule please contact [facility] [place] directly at [phone].  Normal or non-critical lab and imaging results will be communicated to you by [employer], letter or phone within 4 business days after the clinic has received the results. If you do not hear from us within 7 days, please contact the clinic through [employer] or phone. If you have a critical or abnormal lab result, we will notify you by phone as soon as possible.  Submit refill requests through [employer] or call your pharmacy and they will forward the refill request to us. Please allow 3 business days for your refill to be completed.          Additional Information About Your Visit        MyChart Information     [employer] gives you secure access to your electronic health record. If you see a primary care provider, you can also send messages to your care team and make appointments. If you have questions, please call your primary care clinic.  If you do not have a primary care provider, please call [phone] and they will assist you.        Care EveryWhere ID     This is your Care EveryWhere ID. This could be used by  "other organizations to access your [facility] medical records  [id number]        Your Vitals Were     Pulse Temperature Height Pulse Oximetry BMI (Body Mass Index)       72 95.9  F (35.5  C) (Tympanic) 5' 7\" (1.702 m) 99% 33.99 kg/m2        Blood Pressure from Last 3 Encounters:   10/25/18 120/71   04/03/18 133/74   03/22/18 136/64    Weight from Last 3 Encounters:   10/25/18 217 lb (98.4 kg)   04/03/18 216 lb (98 kg)   03/22/18 216 lb (98 kg)              We Performed the Following     Albumin Random Urine Quantitative with Creat Ratio     ALT     Basic metabolic panel  (Ca, Cl, CO2, Creat, Gluc, K, Na, BUN)     FOOT EXAM     Hemoglobin A1c     Hemoglobin     Lipid panel reflex to direct LDL Fasting     PSA, screen        Primary Care Provider Office Phone # Fax #    Hamilton Romo [phone]       [address] [facility] [address]        Equal Access to Services     LALIT ORTEZ : Hadii aad ku hadasho Soomaali, waaxda luqadaha, qaybta kaalmada adeegyada, waxay purain hayfacundo godwin . So [facility] [phone].    ATENCIÓN: Si habla español, tiene a shaver disposición servicios gratuitos de asistencia lingüística. Llame al [phone].    We comply with applicable federal civil rights laws and [place] laws. We do not discriminate on the basis of race, color, national origin, age, disability, sex, sexual orientation, or gender identity.            Thank you!     Thank you for choosing [facility]  for your care. Our goal is always to provide you with excellent care. Hearing back from our patients is one way we can continue to improve our services. Please take a few minutes to complete the written survey that you may receive in the mail after your visit with us. Thank you!             Your Updated Medication List - Protect others around you: Learn how to safely use, store and throw away your medicines at www.disposemymeds.org.          This list is accurate as of " 10/25/18 11:22 AM.  Always use your most recent med list.                   Brand Name Dispense Instructions for use Diagnosis    ALEVE PO      Take by mouth 2 times daily (with meals)        aspirin 81 MG tablet      1 tab po QD (Once per day)    Type II or unspecified type diabetes mellitus without mention of complication, not stated as uncontrolled, Other and unspecified hyperlipidemia       BILBERRY PO      Take by mouth daily        CALCIUM + D PO      Take  by mouth.        IBUPROFEN PO      Take by mouth every 4 hours as needed for moderate pain        lisinopril 5 MG tablet    PRINIVIL/ZESTRIL    90 tablet    TAKE 1 TABLET BY MOUTH DAILY    Microalbuminuria       LUTEIN PO      Take by mouth daily        metFORMIN 500 MG tablet    GLUCOPHAGE    270 tablet    TAKE 1 TABLET (500 MG) BY MOUTH 3 TIMES DAILY (WITH MEALS) . NEED APPOINTMENT FOR FURTHER REFILL    Type 2 diabetes mellitus without complication, without long-term current use of insulin (H)       MULTIVITAMIN TABS   OR      1 DAILY        pravastatin 20 MG tablet    PRAVACHOL    90 tablet    TAKE 0.5 TABLET BY MOUTH DAILY    Type 2 diabetes mellitus without complication, unspecified long term insulin use status, Mixed hyperlipidemia       terazosin 5 MG capsule    HYTRIN    90 capsule    TAKE 1 CAPSULE BY MOUTH NIGHTLY AT BEDTIME    Benign non-nodular prostatic hyperplasia without lower urinary tract symptoms       vitamin B complex with vitamin C Tabs tablet      Take 1 tablet by mouth daily        Vitamin D3 3000 units Tabs      Take  by mouth.

## 2018-10-25 NOTE — PROGRESS NOTES
SUBJECTIVE:   Vikash Arredondo II is a 77 year old male who presents to clinic today for the following health issues:      Diabetes Follow-up    Patient is checking blood sugars: once daily.  Results are as follows:         am -     Diabetic concerns: None     Symptoms of hypoglycemia (low blood sugar): none     Paresthesias (numbness or burning in feet) or sores: No     Date of last diabetic eye exam: 2018    BP Readings from Last 2 Encounters:   04/03/18 133/74   03/22/18 136/64     Hemoglobin A1C (%)   Date Value   03/22/2018 6.0   10/11/2017 6.3 (H)     LDL Cholesterol Calculated (mg/dL)   Date Value   03/17/2017 78   03/18/2016 85       Diabetes Management Resources    Amount of exercise or physical activity: 6-7 days/week for an average of greater than 60 minutes    Problems taking medications regularly: No    Medication side effects: none    Diet: regular (no restrictions)      Problem list and histories reviewed & adjusted, as indicated.  Additional history: as documented    Patient Active Problem List   Diagnosis     BPH (benign prostatic hyperplasia)     CARDIOVASCULAR SCREENING; LDL GOAL LESS THAN 100     Advanced directives, counseling/discussion     Obesity (BMI 30.0-34.9)     Type 2 diabetes mellitus without complications (H)     Normocytic normochromic anemia     Type 2 diabetes mellitus without complication, without long-term current use of insulin (H)     Past Surgical History:   Procedure Laterality Date     C LAMINOTOMY,LUMBAR DISK,1 INTRSP  1999     COLONOSCOPY  2/2002    Normal     COLONOSCOPY  8/11/07    Diverticulosis; repeat in 10 years     EXTRACAPSULAR CATARACT EXTRATION WITH INTRAOCULAR LENS IMPLANT  7/2010    right eye     EXTRACAPSULAR CATARACT EXTRATION WITH INTRAOCULAR LENS IMPLANT  11/2010    left eye     HC REPAIR INCISIONAL HERNIA,REDUCIBLE  1986    Hernia Repair, Incisional, Unilateral     HC REPAIR OF NASAL SEPTUM  2002     HC REPAIR ROTATOR CUFF,ACUTE  8/2008    left  shoulder     HC VASECTOMY UNILAT/BILAT W POSTOP SEMEN  1974    Vasectomy     LAPAROSCOPIC HERNIORRHAPHY VENTRAL  1/23/2013    Procedure: LAPAROSCOPIC HERNIORRHAPHY VENTRAL;  LAPAROSCOPIC VENTRAL  RIGHT HERNIA REPAIR WITH MESH;  Surgeon: Jeanmarie Hutchison MD;  Location: [facility]       Social History   Substance Use Topics     Smoking status: Former Smoker     Packs/day: 0.50     Years: 1.00     Types: Cigarettes     Quit date: 1/2/1961     Smokeless tobacco: Never Used     Alcohol use No     Family History   Problem Relation Age of Onset     Cancer - colorectal Paternal Uncle      C.A.D. Paternal Uncle      Anesthesia Reaction No family hx of      Blood Disease No family hx of      Thyroid Disease No family hx of      Eye Disorder No family hx of      Hypertension No family hx of          Current Outpatient Prescriptions   Medication Sig Dispense Refill     ASPIRIN 81 MG OR TABS 1 tab po QD (Once per day)       Bilberry, Vaccinium myrtillus, (BILBERRY PO) Take by mouth daily       Calcium Carbonate-Vitamin D (CALCIUM + D PO) Take  by mouth.       Cholecalciferol (VITAMIN D3) 3000 UNIT TABS Take  by mouth.       IBUPROFEN PO Take by mouth every 4 hours as needed for moderate pain       lisinopril (PRINIVIL/ZESTRIL) 5 MG tablet TAKE 1 TABLET BY MOUTH DAILY 90 tablet 1     LUTEIN PO Take by mouth daily       metFORMIN (GLUCOPHAGE) 500 MG tablet TAKE 1 TABLET (500 MG) BY MOUTH 3 TIMES DAILY (WITH MEALS) . NEED APPOINTMENT FOR FURTHER REFILL 270 tablet 1     MULTIVITAMIN TABS   OR 1 DAILY       Naproxen Sodium (ALEVE PO) Take by mouth 2 times daily (with meals)       pravastatin (PRAVACHOL) 20 MG tablet TAKE 0.5 TABLET BY MOUTH DAILY 90 tablet 1     terazosin (HYTRIN) 5 MG capsule TAKE 1 CAPSULE BY MOUTH NIGHTLY AT BEDTIME 90 capsule 1     vitamin B complex with vitamin C (VITAMIN  B COMPLEX) TABS tablet Take 1 tablet by mouth daily       Allergies   Allergen Reactions     No Known Drug Allergies      Recent Labs   Lab  "Test  03/22/18   1044  12/01/17   1547  10/11/17   1136  03/17/17   0942   04/21/16   1107  03/18/16   1004   03/16/15   1112   03/18/13   1035   A1C  6.0   --   6.3*  6.3*   < >   --   6.1*   < >  6.4*   < >  5.5   LDL   --    --    --   78   --    --   85   --   52   < >  98   HDL   --    --    --   42   --    --   42   --   48   < >  40   TRIG   --    --    --   141   --    --   97   --   85   < >  91   ALT   --    --   48  42   --   31  32   --   41   --   28   CR   --   1.16  1.17  1.07   < >  1.09  1.06   < >  1.04   < >  1.07   GFRESTIMATED   --   61  60*  67   < >  66  68   < >  70   < >  68   GFRESTBLACK   --   74  73  81   < >  80  82   < >  84   < >  82   POTASSIUM   --   4.2  4.6  4.5   < >  4.2  4.4   < >  3.9   < >  4.8   TSH   --    --   2.13   --    --    --    --    --    --    --   1.73    < > = values in this interval not displayed.      BP Readings from Last 3 Encounters:   10/25/18 120/71   04/03/18 133/74   03/22/18 136/64    Wt Readings from Last 3 Encounters:   10/25/18 217 lb (98.4 kg)   04/03/18 216 lb (98 kg)   03/22/18 216 lb (98 kg)                    Reviewed and updated as needed this visit by clinical staff  Tobacco  Allergies  Meds  Med Hx  Surg Hx  Fam Hx  Soc Hx      Reviewed and updated as needed this visit by Provider         ROS:  Constitutional, HEENT, cardiovascular, pulmonary, gi and gu systems are negative, except as otherwise noted.    OBJECTIVE:     /71  Pulse 72  Temp 95.9  F (35.5  C) (Tympanic)  Ht 5' 7\" (1.702 m)  Wt 217 lb (98.4 kg)  SpO2 99%  BMI 33.99 kg/m2  Body mass index is 33.99 kg/(m^2).  GENERAL: healthy, alert and no distress  NECK: no adenopathy, no asymmetry, masses, or scars and thyroid normal to palpation  RESP: lungs clear to auscultation - no rales, rhonchi or wheezes  CV: regular rate and rhythm, normal S1 S2, no S3 or S4, no murmur, click or rub, no peripheral edema and peripheral pulses strong  ABDOMEN: soft, nontender, no " hepatosplenomegaly, no masses and bowel sounds normal  MS: no gross musculoskeletal defects noted, no edema        ASSESSMENT/PLAN:     (E11.9) Type 2 diabetes mellitus without complication, unspecified whether long term insulin use (H)  (primary encounter diagnosis)  Comment: has been stable with current dose of meds, has no side effect from the meds, will keep watching sx   Plan: Hemoglobin A1c, Hemoglobin, Basic metabolic         panel  (Ca, Cl, CO2, Creat, Gluc, K, Na, BUN),         ALT, Lipid panel reflex to direct LDL Fasting,         Albumin Random Urine Quantitative with Creat         Ratio, PSA, screen, FOOT EXAM            (N40.1) Benign prostatic hyperplasia with lower urinary tract symptoms, symptom details unspecified  Comment: stable, has no side effect from the meds,   Plan: PSA, screen            (Z12.5) Screening for prostate cancer  Comment: stable   Plan: PSA, screen                FUTURE APPOINTMENTS:       - Follow-up visit in 6 months for CPE and DM    Hamilton oRmo MD  [facility]

## 2018-10-25 NOTE — NURSING NOTE
"Chief Complaint   Patient presents with     Recheck Medication       Initial /71  Pulse 72  Temp 95.9  F (35.5  C) (Tympanic)  Ht 5' 7\" (1.702 m)  Wt 217 lb (98.4 kg)  SpO2 99%  BMI 33.99 kg/m2 Estimated body mass index is 33.99 kg/(m^2) as calculated from the following:    Height as of this encounter: 5' 7\" (1.702 m).    Weight as of this encounter: 217 lb (98.4 kg).  BP completed using cuff size: large    Health Maintenance Due   Topic Date Due     MEDICARE ANNUAL WELLNESS VISIT  11/24/1958     EYE EXAM Q1 YEAR  03/16/2018     FALL RISK ASSESSMENT  03/17/2018     LIPID MONITORING Q1 YEAR  03/17/2018     FOOT EXAM Q1 YEAR  07/13/2018     MICROALBUMIN Q1 YEAR  07/13/2018     A1C Q6 MO  09/22/2018         Frances Welch MA 10/25/18          "

## 2018-10-26 LAB
ALT SERPL W P-5'-P-CCNC: 43 U/L (ref 0–70)
ANION GAP SERPL CALCULATED.3IONS-SCNC: 8 MMOL/L (ref 3–14)
BUN SERPL-MCNC: 30 MG/DL (ref 7–30)
CALCIUM SERPL-MCNC: 9.2 MG/DL (ref 8.5–10.1)
CHLORIDE SERPL-SCNC: 102 MMOL/L (ref 94–109)
CHOLEST SERPL-MCNC: 140 MG/DL
CO2 SERPL-SCNC: 25 MMOL/L (ref 20–32)
CREAT SERPL-MCNC: 1.24 MG/DL (ref 0.66–1.25)
GFR SERPL CREATININE-BSD FRML MDRD: 56 ML/MIN/1.7M2
GLUCOSE SERPL-MCNC: 111 MG/DL (ref 70–99)
HDLC SERPL-MCNC: 37 MG/DL
LDLC SERPL CALC-MCNC: 77 MG/DL
NONHDLC SERPL-MCNC: 103 MG/DL
POTASSIUM SERPL-SCNC: 4.8 MMOL/L (ref 3.4–5.3)
PSA SERPL-ACNC: 0.74 UG/L (ref 0–4)
SODIUM SERPL-SCNC: 135 MMOL/L (ref 133–144)
TRIGL SERPL-MCNC: 129 MG/DL

## 2018-10-27 LAB
CREAT UR-MCNC: 84 MG/DL
MICROALBUMIN UR-MCNC: 20 MG/L
MICROALBUMIN/CREAT UR: 23.93 MG/G CR (ref 0–17)

## 2018-12-08 DIAGNOSIS — E11.9 TYPE 2 DIABETES MELLITUS WITHOUT COMPLICATION (H): ICD-10-CM

## 2018-12-08 DIAGNOSIS — E78.2 MIXED HYPERLIPIDEMIA: ICD-10-CM

## 2018-12-10 RX ORDER — PRAVASTATIN SODIUM 20 MG
TABLET ORAL
Qty: 90 TABLET | Refills: 2 | Status: SHIPPED | OUTPATIENT
Start: 2018-12-10 | End: 2019-10-23

## 2018-12-10 NOTE — TELEPHONE ENCOUNTER
Prescription approved per [facility] Refill Protocol.  Meka Walsh RN

## 2018-12-10 NOTE — TELEPHONE ENCOUNTER
"Requested Prescriptions   Pending Prescriptions Disp Refills     pravastatin (PRAVACHOL) 20 MG tablet [Pharmacy Med Name: PRAVASTATIN SODIUM 20 MG TAB] 90 tablet 1    Last Written Prescription Date:  10/11/17  Last Fill Quantity: 90,  # refills: 1   Last office visit: 10/25/2018 with prescribing provider:  YES    Future Office Visit:     Sig: TAKE 0.5 TABLET BY MOUTH DAILY    Statins Protocol Passed - 12/8/2018 12:26 AM       Passed - LDL on file in past 12 months    Recent Labs   Lab Test 10/25/18  1120   LDL 77            Passed - No abnormal creatine kinase in past 12 months    No lab results found.            Passed - Recent (12 mo) or future (30 days) visit within the authorizing provider's specialty    Patient had office visit in the last 12 months or has a visit in the next 30 days with authorizing provider or within the authorizing provider's specialty.  See \"Patient Info\" tab in inbasket, or \"Choose Columns\" in Meds & Orders section of the refill encounter.             Passed - Patient is age 18 or older          "

## 2019-01-09 ENCOUNTER — OFFICE VISIT (OUTPATIENT)
Dept: FAMILY MEDICINE | Facility: CLINIC | Age: 79
End: 2019-01-09
Payer: MEDICARE

## 2019-01-09 VITALS
SYSTOLIC BLOOD PRESSURE: 133 MMHG | BODY MASS INDEX: 35 KG/M2 | WEIGHT: 223 LBS | HEART RATE: 68 BPM | TEMPERATURE: 97.2 F | OXYGEN SATURATION: 99 % | HEIGHT: 67 IN | DIASTOLIC BLOOD PRESSURE: 65 MMHG

## 2019-01-09 DIAGNOSIS — N40.0 BENIGN NON-NODULAR PROSTATIC HYPERPLASIA WITHOUT LOWER URINARY TRACT SYMPTOMS: ICD-10-CM

## 2019-01-09 DIAGNOSIS — E66.01 MORBID OBESITY (H): ICD-10-CM

## 2019-01-09 DIAGNOSIS — E11.9 TYPE 2 DIABETES MELLITUS WITHOUT COMPLICATION, WITHOUT LONG-TERM CURRENT USE OF INSULIN (H): ICD-10-CM

## 2019-01-09 DIAGNOSIS — E78.2 MIXED HYPERLIPIDEMIA: ICD-10-CM

## 2019-01-09 DIAGNOSIS — R80.9 MICROALBUMINURIA: ICD-10-CM

## 2019-01-09 PROCEDURE — 99214 OFFICE O/P EST MOD 30 MIN: CPT | Performed by: FAMILY MEDICINE

## 2019-01-09 RX ORDER — LISINOPRIL 5 MG/1
5 TABLET ORAL DAILY
Qty: 90 TABLET | Refills: 1 | Status: CANCELLED | OUTPATIENT
Start: 2019-01-09

## 2019-01-09 RX ORDER — TERAZOSIN 5 MG/1
CAPSULE ORAL
Qty: 90 CAPSULE | Refills: 1 | Status: SHIPPED | OUTPATIENT
Start: 2019-01-09 | End: 2019-07-30

## 2019-01-09 RX ORDER — PRAVASTATIN SODIUM 20 MG
TABLET ORAL
Qty: 90 TABLET | Refills: 2 | Status: CANCELLED | OUTPATIENT
Start: 2019-01-09

## 2019-01-09 ASSESSMENT — MIFFLIN-ST. JEOR: SCORE: 1677.76

## 2019-01-09 NOTE — PROGRESS NOTES
SUBJECTIVE:   Vikash Arredondo II is a 78 year old male who presents to clinic today for the following health issues:      Medication Followup of lisinopril, metformin, pravastatin, Hytrin     Taking Medication as prescribed: yes    Side Effects:  None    Medication Helping Symptoms:  yes       Problem list and histories reviewed & adjusted, as indicated.  Additional history: as documented    Patient Active Problem List   Diagnosis     BPH (benign prostatic hyperplasia)     CARDIOVASCULAR SCREENING; LDL GOAL LESS THAN 100     Advanced directives, counseling/discussion     Obesity (BMI 30.0-34.9)     Type 2 diabetes mellitus without complications (H)     Normocytic normochromic anemia     Type 2 diabetes mellitus without complication, without long-term current use of insulin (H)     Obesity (BMI 35.0-39.9) with comorbidity (H)     Past Surgical History:   Procedure Laterality Date     C LAMINOTOMY,LUMBAR DISK,1 INTRSP       COLONOSCOPY  2002    Normal     COLONOSCOPY  07    Diverticulosis; repeat in 10 years     EXTRACAPSULAR CATARACT EXTRATION WITH INTRAOCULAR LENS IMPLANT  2010    right eye     EXTRACAPSULAR CATARACT EXTRATION WITH INTRAOCULAR LENS IMPLANT  2010    left eye     HC REPAIR INCISIONAL HERNIA,REDUCIBLE  1986    Hernia Repair, Incisional, Unilateral     HC REPAIR OF NASAL SEPTUM       HC REPAIR ROTATOR CUFF,ACUTE  2008    left shoulder     HC VASECTOMY UNILAT/BILAT W POSTOP SEMEN  1974    Vasectomy     LAPAROSCOPIC HERNIORRHAPHY VENTRAL  2013    Procedure: LAPAROSCOPIC HERNIORRHAPHY VENTRAL;  LAPAROSCOPIC VENTRAL  RIGHT HERNIA REPAIR WITH MESH;  Surgeon: Jeanmarie Hutchison MD;  Location: [facility]       Social History     Tobacco Use     Smoking status: Former Smoker     Packs/day: 0.50     Years: 1.00     Pack years: 0.50     Types: Cigarettes     Last attempt to quit: 1961     Years since quittin.0     Smokeless tobacco: Never Used   Substance Use Topics      Alcohol use: No     Alcohol/week: 0.0 oz     Family History   Problem Relation Age of Onset     Cancer - colorectal Paternal Uncle      C.A.D. Paternal Uncle      Anesthesia Reaction No family hx of      Blood Disease No family hx of      Thyroid Disease No family hx of      Eye Disorder No family hx of      Hypertension No family hx of          Current Outpatient Medications   Medication Sig Dispense Refill     ASPIRIN 81 MG OR TABS 1 tab po QD (Once per day)       Bilberry, Vaccinium myrtillus, (BILBERRY PO) Take by mouth daily       Calcium Carbonate-Vitamin D (CALCIUM + D PO) Take  by mouth.       Cholecalciferol (VITAMIN D3) 3000 UNIT TABS Take  by mouth.       IBUPROFEN PO Take by mouth every 4 hours as needed for moderate pain       lisinopril (PRINIVIL/ZESTRIL) 5 MG tablet TAKE 1 TABLET BY MOUTH DAILY 90 tablet 1     LUTEIN PO Take by mouth daily       metFORMIN (GLUCOPHAGE) 500 MG tablet TAKE 1 TABLET (500 MG) BY MOUTH 3 TIMES DAILY (WITH MEALS) . NEED APPOINTMENT FOR FURTHER REFILL 270 tablet 1     MULTIVITAMIN TABS   OR 1 DAILY       Naproxen Sodium (ALEVE PO) Take by mouth 2 times daily (with meals)       pravastatin (PRAVACHOL) 20 MG tablet TAKE 0.5 TABLET BY MOUTH DAILY 90 tablet 2     terazosin (HYTRIN) 5 MG capsule TAKE 1 CAPSULE BY MOUTH NIGHTLY AT BEDTIME 90 capsule 1     vitamin B complex with vitamin C (VITAMIN  B COMPLEX) TABS tablet Take 1 tablet by mouth daily       Allergies   Allergen Reactions     No Known Drug Allergies      Recent Labs   Lab Test 10/25/18  1120 03/22/18  1044 12/01/17  1547 10/11/17  1136 03/17/17  0942  03/18/16  1004  03/18/13  1035   A1C 6.2* 6.0  --  6.3* 6.3*   < > 6.1*   < > 5.5   LDL 77  --   --   --  78  --  85   < > 98   HDL 37*  --   --   --  42  --  42   < > 40   TRIG 129  --   --   --  141  --  97   < > 91   ALT 43  --   --  48 42   < > 32   < > 28   CR 1.24  --  1.16 1.17 1.07   < > 1.06   < > 1.07   GFRESTIMATED 56*  --  61 60* 67   < > 68   < > 68  "  GFRESTBLACK 68  --  74 73 81   < > 82   < > 82   POTASSIUM 4.8  --  4.2 4.6 4.5   < > 4.4   < > 4.8   TSH  --   --   --  2.13  --   --   --   --  1.73    < > = values in this interval not displayed.      BP Readings from Last 3 Encounters:   01/09/19 133/65   10/25/18 120/71   04/03/18 133/74    Wt Readings from Last 3 Encounters:   01/09/19 101.2 kg (223 lb)   10/25/18 98.4 kg (217 lb)   04/03/18 98 kg (216 lb)                    Reviewed and updated as needed this visit by clinical staff  Allergies  Meds       Reviewed and updated as needed this visit by Provider         ROS:  Constitutional, HEENT, cardiovascular, pulmonary, gi and gu systems are negative, except as otherwise noted.    OBJECTIVE:     /65   Pulse 68   Temp 97.2  F (36.2  C) (Oral)   Ht 1.69 m (5' 6.54\")   Wt 101.2 kg (223 lb)   SpO2 99%   BMI 35.42 kg/m    Body mass index is 35.42 kg/m .  GENERAL: healthy, alert and no distress  EYES: Eyes grossly normal to inspection, PERRL and conjunctivae and sclerae normal  HENT: ear canals and TM's normal, nose and mouth without ulcers or lesions  NECK: no adenopathy, no asymmetry, masses, or scars and thyroid normal to palpation  RESP: lungs clear to auscultation - no rales, rhonchi or wheezes  CV: regular rate and rhythm, normal S1 S2, no S3 or S4, no murmur, click or rub, no peripheral edema and peripheral pulses strong  ABDOMEN: soft, nontender, no hepatosplenomegaly, no masses and bowel sounds normal  MS: no gross musculoskeletal defects noted, no edema        ASSESSMENT/PLAN:   ASSESSMENT / PLAN:  (R80.9) Microalbuminuria  Comment: stable, has no sign of clinical worsening   Plan: will keep watching sx     (E11.9) Type 2 diabetes mellitus without complication, without long-term current use of insulin (H)  Comment: stable   Plan: metFORMIN (GLUCOPHAGE) 500 MG tablet            (E11.9) Type 2 diabetes mellitus without complication (H)  Comment: stable   Plan: keep monitoring     (E78.2) " Mixed hyperlipidemia  Comment: stable   Plan: keep monitoring     (N40.0) Benign non-nodular prostatic hyperplasia without lower urinary tract symptoms  Comment: not having sign of worsening sx   Plan: terazosin (HYTRIN) 5 MG capsule            (E66.01) Morbid obesity (H)  Comment: stable   Plan: keep monitoring         FUTURE APPOINTMENTS:       - Follow-up visit in 3 months for DM check     Hamilton Romo MD  [facility]

## 2019-03-21 ENCOUNTER — OFFICE VISIT (OUTPATIENT)
Dept: FAMILY MEDICINE | Facility: CLINIC | Age: 79
End: 2019-03-21
Payer: MEDICARE

## 2019-03-21 VITALS
OXYGEN SATURATION: 99 % | RESPIRATION RATE: 16 BRPM | BODY MASS INDEX: 34.69 KG/M2 | DIASTOLIC BLOOD PRESSURE: 68 MMHG | HEIGHT: 67 IN | HEART RATE: 80 BPM | WEIGHT: 221 LBS | SYSTOLIC BLOOD PRESSURE: 122 MMHG | TEMPERATURE: 96.5 F

## 2019-03-21 DIAGNOSIS — E66.01 MORBID OBESITY (H): ICD-10-CM

## 2019-03-21 DIAGNOSIS — R80.9 MICROALBUMINURIA: ICD-10-CM

## 2019-03-21 DIAGNOSIS — E11.9 TYPE 2 DIABETES MELLITUS WITHOUT COMPLICATION, WITHOUT LONG-TERM CURRENT USE OF INSULIN (H): ICD-10-CM

## 2019-03-21 DIAGNOSIS — E11.9 TYPE 2 DIABETES MELLITUS WITHOUT COMPLICATION, UNSPECIFIED WHETHER LONG TERM INSULIN USE (H): Primary | ICD-10-CM

## 2019-03-21 LAB
HBA1C MFR BLD: 6.5 % (ref 0–5.6)
HGB BLD-MCNC: 13.4 G/DL (ref 13.3–17.7)

## 2019-03-21 PROCEDURE — 83036 HEMOGLOBIN GLYCOSYLATED A1C: CPT | Performed by: FAMILY MEDICINE

## 2019-03-21 PROCEDURE — 85018 HEMOGLOBIN: CPT | Performed by: FAMILY MEDICINE

## 2019-03-21 PROCEDURE — 84460 ALANINE AMINO (ALT) (SGPT): CPT | Performed by: FAMILY MEDICINE

## 2019-03-21 PROCEDURE — 80048 BASIC METABOLIC PNL TOTAL CA: CPT | Performed by: FAMILY MEDICINE

## 2019-03-21 PROCEDURE — 82043 UR ALBUMIN QUANTITATIVE: CPT | Performed by: FAMILY MEDICINE

## 2019-03-21 PROCEDURE — 36415 COLL VENOUS BLD VENIPUNCTURE: CPT | Performed by: FAMILY MEDICINE

## 2019-03-21 PROCEDURE — 99214 OFFICE O/P EST MOD 30 MIN: CPT | Performed by: FAMILY MEDICINE

## 2019-03-21 RX ORDER — LISINOPRIL 5 MG/1
5 TABLET ORAL DAILY
Qty: 90 TABLET | Refills: 1 | Status: SHIPPED | OUTPATIENT
Start: 2019-03-21 | End: 2019-10-23

## 2019-03-21 ASSESSMENT — MIFFLIN-ST. JEOR: SCORE: 1668.14

## 2019-03-21 NOTE — PROGRESS NOTES
SUBJECTIVE:   Vikash Arredondo II is a 78 year old male who presents to clinic today for the following health issues:      Diabetes Follow-up    Patient is checking blood sugars: once daily.  Results are as follows:         am - 120-130     Diabetic concerns: None     Symptoms of hypoglycemia (low blood sugar): none     Paresthesias (numbness or burning in feet) or sores: No     Date of last diabetic eye exam: 3/1/18.    BP Readings from Last 2 Encounters:   01/09/19 133/65   10/25/18 120/71     Hemoglobin A1C (%)   Date Value   10/25/2018 6.2 (H)   03/22/2018 6.0     LDL Cholesterol Calculated (mg/dL)   Date Value   10/25/2018 77   03/17/2017 78       Diabetes Management Resources    Amount of exercise or physical activity: None, walk at a grocer ry store few hours a week     Problems taking medications regularly: No    Medication side effects: none    Diet: regular (no restrictions)      Problem list and histories reviewed & adjusted, as indicated.  Additional history: as documented    Patient Active Problem List   Diagnosis     BPH (benign prostatic hyperplasia)     CARDIOVASCULAR SCREENING; LDL GOAL LESS THAN 100     Advanced directives, counseling/discussion     Obesity (BMI 30.0-34.9)     Type 2 diabetes mellitus without complications (H)     Normocytic normochromic anemia     Type 2 diabetes mellitus without complication, without long-term current use of insulin (H)     Obesity (BMI 35.0-39.9) with comorbidity (H)     Past Surgical History:   Procedure Laterality Date     C LAMINOTOMY,LUMBAR DISK,1 INTRSP  1999     COLONOSCOPY  2/2002    Normal     COLONOSCOPY  8/11/07    Diverticulosis; repeat in 10 years     EXTRACAPSULAR CATARACT EXTRATION WITH INTRAOCULAR LENS IMPLANT  7/2010    right eye     EXTRACAPSULAR CATARACT EXTRATION WITH INTRAOCULAR LENS IMPLANT  11/2010    left eye     HC REPAIR INCISIONAL HERNIA,REDUCIBLE  1986    Hernia Repair, Incisional, Unilateral     HC REPAIR OF NASAL SEPTUM  2002      HC REPAIR ROTATOR CUFF,ACUTE  2008    left shoulder     HC VASECTOMY UNILAT/BILAT W POSTOP SEMEN  1974    Vasectomy     LAPAROSCOPIC HERNIORRHAPHY VENTRAL  2013    Procedure: LAPAROSCOPIC HERNIORRHAPHY VENTRAL;  LAPAROSCOPIC VENTRAL  RIGHT HERNIA REPAIR WITH MESH;  Surgeon: Jeanmarie Hutchison MD;  Location: [facility]       Social History     Tobacco Use     Smoking status: Former Smoker     Packs/day: 0.50     Years: 1.00     Pack years: 0.50     Types: Cigarettes     Last attempt to quit: 1961     Years since quittin.2     Smokeless tobacco: Never Used   Substance Use Topics     Alcohol use: No     Alcohol/week: 0.0 oz     Family History   Problem Relation Age of Onset     Cancer - colorectal Paternal Uncle      C.A.D. Paternal Uncle      Anesthesia Reaction No family hx of      Blood Disease No family hx of      Thyroid Disease No family hx of      Eye Disorder No family hx of      Hypertension No family hx of          Current Outpatient Medications   Medication Sig Dispense Refill     ASPIRIN 81 MG OR TABS 1 tab po QD (Once per day)       Bilberry, Vaccinium myrtillus, (BILBERRY PO) Take by mouth daily       Calcium Carbonate-Vitamin D (CALCIUM + D PO) Take  by mouth.       Cholecalciferol (VITAMIN D3) 3000 UNIT TABS Take  by mouth.       IBUPROFEN PO Take by mouth every 4 hours as needed for moderate pain       lisinopril (PRINIVIL/ZESTRIL) 5 MG tablet Take 1 tablet (5 mg) by mouth daily 90 tablet 1     LUTEIN PO Take by mouth daily       metFORMIN (GLUCOPHAGE) 500 MG tablet TAKE 1 TABLET (500 MG) BY MOUTH 3 TIMES DAILY (WITH MEALS) . NEED APPOINTMENT FOR FURTHER REFILL 270 tablet 1     MULTIVITAMIN TABS   OR 1 DAILY       pravastatin (PRAVACHOL) 20 MG tablet TAKE 0.5 TABLET BY MOUTH DAILY 90 tablet 2     terazosin (HYTRIN) 5 MG capsule TAKE 1 CAPSULE BY MOUTH NIGHTLY AT BEDTIME 90 capsule 1     vitamin B complex with vitamin C (VITAMIN  B COMPLEX) TABS tablet Take 1 tablet by mouth daily    "    Allergies   Allergen Reactions     No Known Drug Allergies      Recent Labs   Lab Test 10/25/18  1120 03/22/18  1044 12/01/17  1547 10/11/17  1136 03/17/17  0942  03/18/16  1004  03/18/13  1035   A1C 6.2* 6.0  --  6.3* 6.3*   < > 6.1*   < > 5.5   LDL 77  --   --   --  78  --  85   < > 98   HDL 37*  --   --   --  42  --  42   < > 40   TRIG 129  --   --   --  141  --  97   < > 91   ALT 43  --   --  48 42   < > 32   < > 28   CR 1.24  --  1.16 1.17 1.07   < > 1.06   < > 1.07   GFRESTIMATED 56*  --  61 60* 67   < > 68   < > 68   GFRESTBLACK 68  --  74 73 81   < > 82   < > 82   POTASSIUM 4.8  --  4.2 4.6 4.5   < > 4.4   < > 4.8   TSH  --   --   --  2.13  --   --   --   --  1.73    < > = values in this interval not displayed.      BP Readings from Last 3 Encounters:   03/21/19 122/68   01/09/19 133/65   10/25/18 120/71    Wt Readings from Last 3 Encounters:   03/21/19 100.2 kg (221 lb)   01/09/19 101.2 kg (223 lb)   10/25/18 98.4 kg (217 lb)                    Reviewed and updated as needed this visit by clinical staff       Reviewed and updated as needed this visit by Provider         ROS:  Constitutional, HEENT, cardiovascular, pulmonary, gi and gu systems are negative, except as otherwise noted.    OBJECTIVE:     /68 (Cuff Size: Adult Large)   Pulse 80   Temp 96.5  F (35.8  C) (Tympanic)   Resp 16   Ht 1.689 m (5' 6.5\")   Wt 100.2 kg (221 lb)   SpO2 99%   BMI 35.14 kg/m    Body mass index is 35.14 kg/m .  GENERAL: healthy, alert and no distress  EYES: Eyes grossly normal to inspection, PERRL and conjunctivae and sclerae normal  HENT: ear canals and TM's normal, nose and mouth without ulcers or lesions  NECK: no adenopathy, no asymmetry, masses, or scars and thyroid normal to palpation  RESP: lungs clear to auscultation - no rales, rhonchi or wheezes  CV: regular rate and rhythm, normal S1 S2, no S3 or S4, no murmur, click or rub, no peripheral edema and peripheral pulses strong  ABDOMEN: soft, " nontender, no hepatosplenomegaly, no masses and bowel sounds normal  MS: no gross musculoskeletal defects noted, no edema  SKIN: no suspicious lesions or rashes  NEURO: Normal strength and tone, mentation intact and speech normal  BACK: no CVA tenderness, no paralumbar tenderness  PSYCH: mentation appears normal, affect normal/bright  LYMPH: no cervical, supraclavicular, axillary, or inguinal adenopathy        ASSESSMENT/PLAN:   ASSESSMENT / PLAN:  (E11.9) Type 2 diabetes mellitus without complication, unspecified whether long term insulin use (H)  (primary encounter diagnosis)  Comment: has been stable with current dose of meds, will keep watching sx  Plan: Hemoglobin, Hemoglobin A1c, Basic metabolic         panel  (Ca, Cl, CO2, Creat, Gluc, K, Na, BUN),         ALT, Albumin Random Urine Quantitative with         Creat Ratio            (E66.01) Obesity (BMI 35.0-39.9) with comorbidity (H)  Comment: not improving, encouraged him to keep working on life style modification   Plan: Hemoglobin, Hemoglobin A1c, Basic metabolic         panel  (Ca, Cl, CO2, Creat, Gluc, K, Na, BUN),         ALT, Albumin Random Urine Quantitative with         Creat Ratio            (E11.9) Type 2 diabetes mellitus without complication, without long-term current use of insulin (H)  Comment: mentioned above   Plan: metFORMIN (GLUCOPHAGE) 500 MG tablet            (R80.9) Microalbuminuria  Comment: has been stable, will review the lab and update pt   Plan: lisinopril (PRINIVIL/ZESTRIL) 5 MG tablet            FUTURE APPOINTMENTS:       - Follow-up visit in 6 months    He needs update current health status related with DM/microalbuminuria and HNT to aviation medicine(Dr Berger). When the lab results available, I will write letter as we haven been doing annually     Hamilton Romo MD  [facility]

## 2019-03-21 NOTE — LETTER
"[facility]  [address] [address]  Phone: [phone]    March 22, 2019        Vikash Arredondo II  [address]   [place] [facility] [address]      Ref: PI # [id number]          [facility] # [id number]                AAP ID # [id number]         To whom it may concern:       RE: Vikash Arredondo is followed at our clinic for diabetes. He has had good control with current regimen, and diabetic status is stable. He is currently on lisinopril for renal protection, and his latest urine microalbumin is within normal range. In general, his diabetes is well controlled so far.      I attached his recent date of blood pressure and lab results, and these looked very stable on current regimen. He does not need to change in regimen.       Vital Signs 4/3/2018 10/25/2018 1/9/2019 3/21/2019   Systolic 133 120 133 122   Diastolic 74 71 65 68   Pulse 86 72 68 80   Temperature 98.2 95.9 97.2 96.5   Respirations 16   16   Weight (LB) 216 lb 217 lb 223 lb 221 lb   Height 5' 7\" 5' 7\" 5' 6.535\" 5' 6.5\"   BMI (Calculated) 33.9 34.06 35.49 35.14   Pain       O2 98 99 99 99     Please contact me for questions or concerns.      Sincerely,        Hamilton Romo MD  "

## 2019-03-22 ENCOUNTER — MYC MEDICAL ADVICE (OUTPATIENT)
Dept: FAMILY MEDICINE | Facility: CLINIC | Age: 79
End: 2019-03-22

## 2019-03-22 ENCOUNTER — TRANSFERRED RECORDS (OUTPATIENT)
Dept: HEALTH INFORMATION MANAGEMENT | Facility: CLINIC | Age: 79
End: 2019-03-22

## 2019-03-22 LAB
ALT SERPL W P-5'-P-CCNC: 38 U/L (ref 0–70)
ANION GAP SERPL CALCULATED.3IONS-SCNC: 10 MMOL/L (ref 3–14)
BUN SERPL-MCNC: 24 MG/DL (ref 7–30)
CALCIUM SERPL-MCNC: 9 MG/DL (ref 8.5–10.1)
CHLORIDE SERPL-SCNC: 104 MMOL/L (ref 94–109)
CO2 SERPL-SCNC: 22 MMOL/L (ref 20–32)
CREAT SERPL-MCNC: 1.15 MG/DL (ref 0.66–1.25)
CREAT UR-MCNC: 101 MG/DL
GFR SERPL CREATININE-BSD FRML MDRD: 60 ML/MIN/{1.73_M2}
GLUCOSE SERPL-MCNC: 123 MG/DL (ref 70–99)
MICROALBUMIN UR-MCNC: 22 MG/L
MICROALBUMIN/CREAT UR: 21.49 MG/G CR (ref 0–17)
POTASSIUM SERPL-SCNC: 4.4 MMOL/L (ref 3.4–5.3)
SODIUM SERPL-SCNC: 136 MMOL/L (ref 133–144)

## 2019-03-25 NOTE — TELEPHONE ENCOUNTER
Please see [email or web address] message below and advise.   Stephy Dozier RN   [facility] - Triage

## 2019-04-01 ENCOUNTER — TELEPHONE (OUTPATIENT)
Dept: FAMILY MEDICINE | Facility: CLINIC | Age: 79
End: 2019-04-01

## 2019-04-01 NOTE — TELEPHONE ENCOUNTER
Please abstract the following data from this visit with this patient into the appropriate field in Epic:    Eye exam with ophthalmology on this date: 3/22/19.

## 2019-07-30 DIAGNOSIS — N40.0 BENIGN NON-NODULAR PROSTATIC HYPERPLASIA WITHOUT LOWER URINARY TRACT SYMPTOMS: ICD-10-CM

## 2019-07-30 NOTE — TELEPHONE ENCOUNTER
"Last Written Prescription Date:  1/9/19  Last Fill Quantity: 90 capsules,  # refills: 1   Last office visit: 3/21/2019 with prescribing provider:  Demetrius   Future Office Visit:      Requested Prescriptions   Pending Prescriptions Disp Refills     terazosin (HYTRIN) 5 MG capsule [Pharmacy Med Name: Terazosin HCl Oral Capsule 5 MG] 90 capsule 0     Sig: TAKE ONE CAPSULE BY MOUTH ONCE DAILY AT BEDTIME       Alpha Blockers Passed - 7/30/2019  1:36 PM        Passed - Blood pressure under 140/90 in past 12 months     BP Readings from Last 3 Encounters:   03/21/19 122/68   01/09/19 133/65   10/25/18 120/71                 Passed - Recent (12 mo) or future (30 days) visit within the authorizing provider's specialty     Patient had office visit in the last 12 months or has a visit in the next 30 days with authorizing provider or within the authorizing provider's specialty.  See \"Patient Info\" tab in inbasket, or \"Choose Columns\" in Meds & Orders section of the refill encounter.              Passed - Patient does not have Tadalafil, Vardenafil, or Sildenafil on their medication list        Passed - Medication is active on med list        Passed - Patient is 18 years of age or older          "

## 2019-07-31 RX ORDER — TERAZOSIN 5 MG/1
CAPSULE ORAL
Qty: 90 CAPSULE | Refills: 3 | Status: SHIPPED | OUTPATIENT
Start: 2019-07-31 | End: 2020-09-11

## 2019-09-09 ENCOUNTER — ALLIED HEALTH/NURSE VISIT (OUTPATIENT)
Dept: NURSING | Facility: CLINIC | Age: 79
End: 2019-09-09
Payer: MEDICARE

## 2019-09-09 DIAGNOSIS — Z23 NEED FOR PROPHYLACTIC VACCINATION AND INOCULATION AGAINST INFLUENZA: Primary | ICD-10-CM

## 2019-09-09 PROCEDURE — 90662 IIV NO PRSV INCREASED AG IM: CPT

## 2019-09-09 PROCEDURE — 99207 ZZC NO CHARGE NURSE ONLY: CPT

## 2019-09-09 PROCEDURE — G0008 ADMIN INFLUENZA VIRUS VAC: HCPCS

## 2019-10-01 ENCOUNTER — HEALTH MAINTENANCE LETTER (OUTPATIENT)
Age: 79
End: 2019-10-01

## 2019-10-23 ENCOUNTER — OFFICE VISIT (OUTPATIENT)
Dept: FAMILY MEDICINE | Facility: CLINIC | Age: 79
End: 2019-10-23
Payer: MEDICARE

## 2019-10-23 VITALS
BODY MASS INDEX: 34.53 KG/M2 | WEIGHT: 220 LBS | RESPIRATION RATE: 18 BRPM | HEART RATE: 66 BPM | TEMPERATURE: 98.6 F | SYSTOLIC BLOOD PRESSURE: 128 MMHG | DIASTOLIC BLOOD PRESSURE: 64 MMHG | HEIGHT: 67 IN | OXYGEN SATURATION: 99 %

## 2019-10-23 DIAGNOSIS — E11.9 TYPE 2 DIABETES MELLITUS WITHOUT COMPLICATION, UNSPECIFIED WHETHER LONG TERM INSULIN USE (H): Primary | ICD-10-CM

## 2019-10-23 DIAGNOSIS — Z13.6 CARDIOVASCULAR SCREENING; LDL GOAL LESS THAN 100: ICD-10-CM

## 2019-10-23 DIAGNOSIS — E66.811 OBESITY (BMI 30.0-34.9): ICD-10-CM

## 2019-10-23 DIAGNOSIS — E78.2 MIXED HYPERLIPIDEMIA: ICD-10-CM

## 2019-10-23 DIAGNOSIS — E11.9 TYPE 2 DIABETES MELLITUS WITHOUT COMPLICATION, WITHOUT LONG-TERM CURRENT USE OF INSULIN (H): ICD-10-CM

## 2019-10-23 DIAGNOSIS — R80.9 MICROALBUMINURIA: ICD-10-CM

## 2019-10-23 LAB
ANION GAP SERPL CALCULATED.3IONS-SCNC: 5 MMOL/L (ref 3–14)
BUN SERPL-MCNC: 25 MG/DL (ref 7–30)
CALCIUM SERPL-MCNC: 8.9 MG/DL (ref 8.5–10.1)
CHLORIDE SERPL-SCNC: 105 MMOL/L (ref 94–109)
CHOLEST SERPL-MCNC: 155 MG/DL
CO2 SERPL-SCNC: 25 MMOL/L (ref 20–32)
CREAT SERPL-MCNC: 1.06 MG/DL (ref 0.66–1.25)
GFR SERPL CREATININE-BSD FRML MDRD: 66 ML/MIN/{1.73_M2}
GLUCOSE SERPL-MCNC: 113 MG/DL (ref 70–99)
HBA1C MFR BLD: 6.6 % (ref 0–5.6)
HDLC SERPL-MCNC: 37 MG/DL
HGB BLD-MCNC: 13.4 G/DL (ref 13.3–17.7)
LDLC SERPL CALC-MCNC: 93 MG/DL
NONHDLC SERPL-MCNC: 118 MG/DL
POTASSIUM SERPL-SCNC: 4.3 MMOL/L (ref 3.4–5.3)
SODIUM SERPL-SCNC: 135 MMOL/L (ref 133–144)
TRIGL SERPL-MCNC: 126 MG/DL
TSH SERPL DL<=0.005 MIU/L-ACNC: 2.99 MU/L (ref 0.4–4)

## 2019-10-23 PROCEDURE — 99214 OFFICE O/P EST MOD 30 MIN: CPT | Performed by: FAMILY MEDICINE

## 2019-10-23 PROCEDURE — 80061 LIPID PANEL: CPT | Performed by: FAMILY MEDICINE

## 2019-10-23 PROCEDURE — 36415 COLL VENOUS BLD VENIPUNCTURE: CPT | Performed by: FAMILY MEDICINE

## 2019-10-23 PROCEDURE — 83036 HEMOGLOBIN GLYCOSYLATED A1C: CPT | Performed by: FAMILY MEDICINE

## 2019-10-23 PROCEDURE — 85018 HEMOGLOBIN: CPT | Performed by: FAMILY MEDICINE

## 2019-10-23 PROCEDURE — 82043 UR ALBUMIN QUANTITATIVE: CPT | Performed by: FAMILY MEDICINE

## 2019-10-23 PROCEDURE — 84443 ASSAY THYROID STIM HORMONE: CPT | Performed by: FAMILY MEDICINE

## 2019-10-23 PROCEDURE — 80048 BASIC METABOLIC PNL TOTAL CA: CPT | Performed by: FAMILY MEDICINE

## 2019-10-23 RX ORDER — PRAVASTATIN SODIUM 20 MG
TABLET ORAL
Qty: 90 TABLET | Refills: 3 | Status: SHIPPED | OUTPATIENT
Start: 2019-10-23 | End: 2020-09-11

## 2019-10-23 RX ORDER — LISINOPRIL 5 MG/1
5 TABLET ORAL DAILY
Qty: 90 TABLET | Refills: 1 | Status: SHIPPED | OUTPATIENT
Start: 2019-10-23 | End: 2021-01-20

## 2019-10-23 ASSESSMENT — MIFFLIN-ST. JEOR: SCORE: 1663.6

## 2019-10-23 NOTE — PROGRESS NOTES
Subjective     Vikash Arredondo II is a 78 year old male who presents to clinic today for the following health issues:    HPI   Diabetes Follow-up      How often are you checking your blood sugar? One time daily    What time of day are you checking your blood sugars (select all that apply)?  Before meals    Have you had any blood sugars above 200?  No    Have you had any blood sugars below 70?  No    What symptoms do you notice when your blood sugar is low?  None    What concerns do you have today about your diabetes? None     Do you have any of these symptoms? (Select all that apply)  No numbness or tingling in feet.  No redness, sores or blisters on feet.  No complaints of excessive thirst.  No reports of blurry vision.  No significant changes to weight.     Have you had a diabetic eye exam in the last 12 months? Yes- Date of last eye exam: 3/22/19    BP Readings from Last 2 Encounters:   03/21/19 122/68   01/09/19 133/65     Hemoglobin A1C (%)   Date Value   03/21/2019 6.5 (H)   10/25/2018 6.2 (H)     LDL Cholesterol Calculated (mg/dL)   Date Value   10/25/2018 77   03/17/2017 78       Diabetes Management Resources      How many servings of fruits and vegetables do you eat daily?  2-3    On average, how many sweetened beverages do you drink each day (soda, juice, sweet tea, etc)?   1    How many days per week do you miss taking your medication? 0    Patient Active Problem List   Diagnosis     BPH (benign prostatic hyperplasia)     CARDIOVASCULAR SCREENING; LDL GOAL LESS THAN 100     Advanced directives, counseling/discussion     Obesity (BMI 30.0-34.9)     Type 2 diabetes mellitus without complications (H)     Normocytic normochromic anemia     Type 2 diabetes mellitus without complication, without long-term current use of insulin (H)     Obesity (BMI 35.0-39.9) with comorbidity (H)     Past Surgical History:   Procedure Laterality Date     C LAMINOTOMY,LUMBAR DISK,1 INTRSP 1999     COLONOSCOPY  2/2002     Normal     COLONOSCOPY  07    Diverticulosis; repeat in 10 years     EXTRACAPSULAR CATARACT EXTRATION WITH INTRAOCULAR LENS IMPLANT  2010    right eye     EXTRACAPSULAR CATARACT EXTRATION WITH INTRAOCULAR LENS IMPLANT  2010    left eye     HC REPAIR INCISIONAL HERNIA,REDUCIBLE      Hernia Repair, Incisional, Unilateral     HC REPAIR OF NASAL SEPTUM       HC REPAIR ROTATOR CUFF,ACUTE  2008    left shoulder     HC VASECTOMY UNILAT/BILAT W POSTOP SEMEN  1974    Vasectomy     LAPAROSCOPIC HERNIORRHAPHY VENTRAL  2013    Procedure: LAPAROSCOPIC HERNIORRHAPHY VENTRAL;  LAPAROSCOPIC VENTRAL  RIGHT HERNIA REPAIR WITH MESH;  Surgeon: Jeanmarie Hutchison MD;  Location: [facility]       Social History     Tobacco Use     Smoking status: Former Smoker     Packs/day: 0.50     Years: 1.00     Pack years: 0.50     Types: Cigarettes     Last attempt to quit: 1961     Years since quittin.8     Smokeless tobacco: Never Used   Substance Use Topics     Alcohol use: No     Alcohol/week: 0.0 standard drinks     Family History   Problem Relation Age of Onset     Cancer - colorectal Paternal Uncle      C.A.D. Paternal Uncle      Anesthesia Reaction No family hx of      Blood Disease No family hx of      Thyroid Disease No family hx of      Eye Disorder No family hx of      Hypertension No family hx of          Current Outpatient Medications   Medication Sig Dispense Refill     ASPIRIN 81 MG OR TABS 1 tab po QD (Once per day)       Bilberry, Vaccinium myrtillus, (BILBERRY PO) Take by mouth daily       Calcium Carbonate-Vitamin D (CALCIUM + D PO) Take  by mouth.       Cholecalciferol (VITAMIN D3) 3000 UNIT TABS Take  by mouth.       IBUPROFEN PO Take by mouth every 4 hours as needed for moderate pain       lisinopril (PRINIVIL/ZESTRIL) 5 MG tablet Take 1 tablet (5 mg) by mouth daily 90 tablet 1     LUTEIN PO Take by mouth daily       metFORMIN (GLUCOPHAGE) 500 MG tablet TAKE 1 TABLET (500 MG) BY MOUTH 3 TIMES  "DAILY (WITH MEALS) . NEED APPOINTMENT FOR FURTHER REFILL 270 tablet 1     MULTIVITAMIN TABS   OR 1 DAILY       pravastatin (PRAVACHOL) 20 MG tablet TAKE 0.5 TABLET BY MOUTH DAILY 90 tablet 3     terazosin (HYTRIN) 5 MG capsule TAKE ONE CAPSULE BY MOUTH ONCE DAILY AT BEDTIME 90 capsule 3     vitamin B complex with vitamin C (VITAMIN  B COMPLEX) TABS tablet Take 1 tablet by mouth daily       Allergies   Allergen Reactions     No Known Drug Allergies      Recent Labs   Lab Test 03/21/19  1215 10/25/18  1120 03/22/18  1044  10/11/17  1136 03/17/17  0942  03/18/16  1004  03/18/13  1035   A1C 6.5* 6.2* 6.0  --  6.3* 6.3*   < > 6.1*   < > 5.5   LDL  --  77  --   --   --  78  --  85   < > 98   HDL  --  37*  --   --   --  42  --  42   < > 40   TRIG  --  129  --   --   --  141  --  97   < > 91   ALT 38 43  --   --  48 42   < > 32   < > 28   CR 1.15 1.24  --    < > 1.17 1.07   < > 1.06   < > 1.07   GFRESTIMATED 60* 56*  --    < > 60* 67   < > 68   < > 68   GFRESTBLACK 70 68  --    < > 73 81   < > 82   < > 82   POTASSIUM 4.4 4.8  --    < > 4.6 4.5   < > 4.4   < > 4.8   TSH  --   --   --   --  2.13  --   --   --   --  1.73    < > = values in this interval not displayed.      BP Readings from Last 3 Encounters:   10/23/19 128/64   03/21/19 122/68   01/09/19 133/65    Wt Readings from Last 3 Encounters:   10/23/19 99.8 kg (220 lb)   03/21/19 100.2 kg (221 lb)   01/09/19 101.2 kg (223 lb)             Reviewed and updated as needed this visit by Provider         Review of Systems   ROS COMP: Constitutional, HEENT, cardiovascular, pulmonary, gi and gu systems are negative, except as otherwise noted.      Objective    /64 (Cuff Size: Adult Large)   Pulse 66   Temp 98.6  F (37  C) (Tympanic)   Resp 18   Ht 1.689 m (5' 6.5\")   Wt 99.8 kg (220 lb)   SpO2 99%   BMI 34.98 kg/m    Body mass index is 34.98 kg/m .  Physical Exam   GENERAL: healthy, alert and no distress  EYES: Eyes grossly normal to inspection, PERRL and " conjunctivae and sclerae normal  HENT: ear canals and TM's normal, nose and mouth without ulcers or lesions  NECK: no adenopathy, no asymmetry, masses, or scars and thyroid normal to palpation  RESP: lungs clear to auscultation - no rales, rhonchi or wheezes  CV: regular rate and rhythm, normal S1 S2, no S3 or S4, no murmur, click or rub, no peripheral edema and peripheral pulses strong  ABDOMEN: soft, nontender, no hepatosplenomegaly, no masses and bowel sounds normal  MS: no gross musculoskeletal defects noted, no edema  SKIN: no suspicious lesions or rashes  NEURO: Normal strength and tone, mentation intact and speech normal  BACK: no CVA tenderness, no paralumbar tenderness  PSYCH: mentation appears normal, affect normal/bright  LYMPH: no cervical, supraclavicular, axillary, or inguinal adenopathy            Assessment & Plan     1. Obesity (BMI 30.0-34.9)  Has no change, encouraged him to keep monitoring, and keep working on life style modification including low fat/carb diet with regular exercise   - HEMOGLOBIN A1C  - TSH WITH FREE T4 REFLEX  - Lipid panel reflex to direct LDL Fasting  - Hemoglobin  - Basic metabolic panel  - Albumin Random Urine Quantitative with Creat Ratio    2. Type 2 diabetes mellitus without complication, unspecified whether long term insulin use (H)  Has been stable with current dose of meds, has no side effect from the meds, will keep monitoring   - HEMOGLOBIN A1C  - TSH WITH FREE T4 REFLEX  - Lipid panel reflex to direct LDL Fasting  - Hemoglobin  - Basic metabolic panel  - Albumin Random Urine Quantitative with Creat Ratio    3. CARDIOVASCULAR SCREENING; LDL GOAL LESS THAN 100    - HEMOGLOBIN A1C  - TSH WITH FREE T4 REFLEX  - Lipid panel reflex to direct LDL Fasting  - Hemoglobin  - Basic metabolic panel  - Albumin Random Urine Quantitative with Creat Ratio    4. Microalbuminuria  Stable, has no clinical sx of worsening  Will keep monitoring   - lisinopril (PRINIVIL/ZESTRIL) 5 MG  "tablet; Take 1 tablet (5 mg) by mouth daily  Dispense: 90 tablet; Refill: 1    5. Mixed hyperlipidemia  Stable, keep monitoring   - pravastatin (PRAVACHOL) 20 MG tablet; TAKE 0.5 TABLET BY MOUTH DAILY  Dispense: 90 tablet; Refill: 3    6. Type 2 diabetes mellitus without complication, without long-term current use of insulin (H)  Stable   - pravastatin (PRAVACHOL) 20 MG tablet; TAKE 0.5 TABLET BY MOUTH DAILY  Dispense: 90 tablet; Refill: 3  - metFORMIN (GLUCOPHAGE) 500 MG tablet; TAKE 1 TABLET (500 MG) BY MOUTH 3 TIMES DAILY (WITH MEALS) . NEED APPOINTMENT FOR FURTHER REFILL  Dispense: 270 tablet; Refill: 1     BMI:   Estimated body mass index is 34.98 kg/m  as calculated from the following:    Height as of this encounter: 1.689 m (5' 6.5\").    Weight as of this encounter: 99.8 kg (220 lb).           FUTURE APPOINTMENTS:       - Follow-up visit in 6 months     No follow-ups on file.    Hamilton Romo MD  [facility] [place]        "

## 2019-10-24 LAB
CREAT UR-MCNC: 122 MG/DL
MICROALBUMIN UR-MCNC: 33 MG/L
MICROALBUMIN/CREAT UR: 27.3 MG/G CR (ref 0–17)

## 2019-12-15 ENCOUNTER — HEALTH MAINTENANCE LETTER (OUTPATIENT)
Age: 79
End: 2019-12-15

## 2020-02-08 NOTE — PROGRESS NOTES
SUBJECTIVE:   Vikash Arredondo II is a 76 year old male who presents to clinic today for the following health issues:      Diabetes Follow-up    Patient is checking blood sugars: once daily.  Results are as follows:       am - 110/120        Diabetic concerns: None     Symptoms of hypoglycemia (low blood sugar): none     Paresthesias (numbness or burning in feet) or sores: No   Date of last diabetic eye exam: 3/16/17      Amount of exercise or physical activity: 2-3 days/week for an average of 30-45 minutes    Problems taking medications regularly: No    Medication side effects: none  Diet: regular (no restrictions)    Problem list and histories reviewed & adjusted, as indicated.  Additional history: as documented    Patient Active Problem List   Diagnosis     BPH (benign prostatic hyperplasia)     CARDIOVASCULAR SCREENING; LDL GOAL LESS THAN 100     Advanced directives, counseling/discussion     Obesity (BMI 30.0-34.9)     Type 2 diabetes mellitus without complications (H)     Normocytic normochromic anemia     Past Surgical History:   Procedure Laterality Date     C LAMINOTOMY,LUMBAR DISK,1 INTRSP  1999     COLONOSCOPY  2/2002    Normal     COLONOSCOPY  8/11/07    Diverticulosis; repeat in 10 years     EXTRACAPSULAR CATARACT EXTRATION WITH INTRAOCULAR LENS IMPLANT  7/2010    right eye     EXTRACAPSULAR CATARACT EXTRATION WITH INTRAOCULAR LENS IMPLANT  11/2010    left eye     HC REPAIR INCISIONAL HERNIA,REDUCIBLE  1986    Hernia Repair, Incisional, Unilateral     HC REPAIR OF NASAL SEPTUM  2002     HC REPAIR ROTATOR CUFF,ACUTE  8/2008    left shoulder     HC VASECTOMY UNILAT/BILAT W POSTOP SEMEN  1974    Vasectomy     LAPAROSCOPIC HERNIORRHAPHY VENTRAL  1/23/2013    Procedure: LAPAROSCOPIC HERNIORRHAPHY VENTRAL;  LAPAROSCOPIC VENTRAL  RIGHT HERNIA REPAIR WITH MESH;  Surgeon: Jeanmarie Hutchison MD;  Location: [facility]       Social History   Substance Use Topics     Smoking status: Former Smoker     Packs/day:  General Medicine H&P     Patient presents with:  Cellulitis       PCP: Em Francisco MD    History of Present Illness: Patient is a 70year old female with PMH including but not limited to COPD, DM, depression, HL, HTN, who p/t [facility] ED c wound infectio 0.50     Years: 1.00     Types: Cigarettes     Quit date: 1/2/1961     Smokeless tobacco: Never Used     Alcohol use No     Family History   Problem Relation Age of Onset     Cancer - colorectal Paternal Uncle      C.A.D. Paternal Uncle      Anesthesia Reaction No family hx of      Blood Disease No family hx of      Thyroid Disease No family hx of      Eye Disorder No family hx of      Hypertension No family hx of          Current Outpatient Prescriptions   Medication Sig Dispense Refill     metFORMIN (GLUCOPHAGE) 500 MG tablet Take 1 tablet (500 mg) by mouth 3 times daily (with meals) . Need appointment for further refill 270 tablet 1     pravastatin (PRAVACHOL) 20 MG tablet TAKE 0.5 TABLET BY MOUTH DAILY 90 tablet 1     terazosin (HYTRIN) 5 MG capsule TAKE ONE CAPSULE BY MOUTH NIGHTLY AT BEDTIME 90 capsule 1     lisinopril (PRINIVIL/ZESTRIL) 5 MG tablet Take 1 tablet (5 mg) by mouth daily 90 tablet 1     Calcium Carbonate-Vitamin D (CALCIUM + D PO) Take  by mouth.       Cholecalciferol (VITAMIN D3) 3000 UNIT TABS Take  by mouth.       ASPIRIN 81 MG OR TABS 1 tab po QD (Once per day)       MULTIVITAMIN TABS   OR 1 DAILY       [DISCONTINUED] metFORMIN (GLUCOPHAGE) 500 MG tablet Take 1 tablet (500 mg) by mouth 3 times daily (with meals) . Need appointment for further refill 270 tablet 1     [DISCONTINUED] pravastatin (PRAVACHOL) 20 MG tablet TAKE 0.5 TABLET BY MOUTH DAILY 90 tablet 1     [DISCONTINUED] terazosin (HYTRIN) 5 MG capsule TAKE ONE CAPSULE BY MOUTH NIGHTLY AT BEDTIME 90 capsule 1     [DISCONTINUED] lisinopril (PRINIVIL/ZESTRIL) 5 MG tablet Take 1 tablet (5 mg) by mouth daily 90 tablet 1     Allergies   Allergen Reactions     No Known Drug Allergies      Recent Labs   Lab Test  03/17/17   0942  09/29/16   0939  04/21/16   1107  03/18/16   1004   03/16/15   1112   03/18/13   1035   03/10/11   1209   A1C  6.3*  5.9   --   6.1*   < >  6.4*   < >  5.5   < >  5.7   LDL  78   --    --   85   --   52   < >  98   <  LEFT     • OTHER SURGICAL HISTORY      spinal fusion   • OTHER SURGICAL HISTORY  8/21/14    Cysto LT [facility], LT Stent- Dr Narciso Currie   • OTHER SURGICAL HISTORY  8/25/14    Cysto Stent Removal-DR Rodriguez   • OTHER SURGICAL HISTORY  01/28/2020    L ">  87   HDL  42   --    --   42   --   48   < >  40   < >  31*   TRIG  141   --    --   97   --   85   < >  91   < >  111   ALT  42   --   31  32   --   41   --   28   < >  26   CR  1.07  1.01  1.09  1.06   < >  1.04   < >  1.07   < >  1.03   GFRESTIMATED  67  72  66  68   < >  70   < >  68   < >  71   GFRESTBLACK  81  87  80  82   < >  84   < >  82   < >  86   POTASSIUM  4.5  4.3  4.2  4.4   < >  3.9   < >  4.8   < >  4.3   TSH   --    --    --    --    --    --    --   1.73   --   1.38    < > = values in this interval not displayed.      BP Readings from Last 3 Encounters:   10/11/17 134/79   07/20/17 122/62   07/17/17 122/74    Wt Readings from Last 3 Encounters:   10/11/17 217 lb (98.4 kg)   07/13/17 219 lb (99.3 kg)   03/17/17 217 lb (98.4 kg)              Reviewed and updated as needed this visit by clinical staff     Reviewed and updated as needed this visit by Provider         ROS:  Constitutional, HEENT, cardiovascular, pulmonary, gi and gu systems are negative, except as otherwise noted.      OBJECTIVE:   /79 (Cuff Size: Adult Large)  Pulse 87  Temp 96.7  F (35.9  C) (Tympanic)  Resp 14  Ht 5' 7\" (1.702 m)  Wt 217 lb (98.4 kg)  SpO2 99%  BMI 33.99 kg/m2  Body mass index is 33.99 kg/(m^2).  GENERAL: healthy, alert and no distress  NECK: no adenopathy, no asymmetry, masses, or scars and thyroid normal to palpation  RESP: lungs clear to auscultation - no rales, rhonchi or wheezes  CV: regular rate and rhythm, normal S1 S2, no S3 or S4, no murmur, click or rub, no peripheral edema and peripheral pulses strong  ABDOMEN: soft, nontender, no hepatosplenomegaly, no masses and bowel sounds normal  MS: no gross musculoskeletal defects noted, no edema        ASSESSMENT/PLAN:   ASSESSMENT / PLAN:  (E11.9) Type 2 diabetes mellitus without complication, unspecified long term insulin use status (H)  (primary encounter diagnosis)  Comment: has been stable without clinical sx of worsening, will have him to " Used    Alcohol use: No       Fam Hx  Family History   Problem Relation Age of Onset   • Cancer Father         renal cell arcinoma   • Cancer Brother         melanoma, esophageal       Review of Systems  Comprehensive ROS reviewed and negative except for w is seen. CONCLUSION:  Postsurgical changes are noted as described above. There is likely a background of mild vascular congestion and volume overload.   No focal consolidation is seen although if there is persistent clinical concern then recommend ded keep monitoring   Plan: HEMOGLOBIN A1C, TSH WITH FREE T4 REFLEX, Basic         metabolic panel  (Ca, Cl, CO2, Creat, Gluc, K,         Na, BUN), ALT, metFORMIN (GLUCOPHAGE) 500 MG         tablet, pravastatin (PRAVACHOL) 20 MG tablet            (E78.2) Mixed hyperlipidemia  Comment: has been stable, will keep monitoring    Plan: pravastatin (PRAVACHOL) 20 MG tablet            (N40.0) Benign non-nodular prostatic hyperplasia without lower urinary tract symptoms  Comment: stable with current dose of meds,   Plan: terazosin (HYTRIN) 5 MG capsule            (R80.9) Microalbuminuria  Comment: has been improved with current treatment, will keep watching sx   Plan: lisinopril (PRINIVIL/ZESTRIL) 5 MG tablet            FUTURE APPOINTMENTS:       - Follow-up visit in 6 months     Hamilton Romo MD  [facility] [place]

## 2020-05-11 DIAGNOSIS — E11.9 TYPE 2 DIABETES MELLITUS WITHOUT COMPLICATION, WITHOUT LONG-TERM CURRENT USE OF INSULIN (H): ICD-10-CM

## 2020-05-11 NOTE — TELEPHONE ENCOUNTER
Routing refill request to provider for review/approval because:  Labs not current:  A1C    Ora Alvarez RN, BSN  [facility] [place]

## 2020-07-18 NOTE — MR AVS SNAPSHOT
After Visit Summary   4/3/2018    Vikash Arredondo II    MRN: [id number]           Patient Information     Date Of Birth          1940        Visit Information        Provider Department      4/3/2018 10:00 AM Hamilton Romo MD [facility] [place]        Today's Diagnoses     Type 2 diabetes mellitus without complication, unspecified long term insulin use status (H)    -  1    Obesity (BMI 30.0-34.9)        Benign prostatic hyperplasia with lower urinary tract symptoms, symptom details unspecified           Follow-ups after your visit        Follow-up notes from your care team     Return in about 6 months (around 10/3/2018) for Physical Exam, Routine Visit, DM recheck.      Who to contact     If you have questions or need follow up information about today's clinic visit or your schedule please contact [facility] [place] directly at [phone].  Normal or non-critical lab and imaging results will be communicated to you by MyChart, letter or phone within 4 business days after the clinic has received the results. If you do not hear from us within 7 days, please contact the clinic through [facility] or phone. If you have a critical or abnormal lab result, we will notify you by phone as soon as possible.  Submit refill requests through [employer] or call your pharmacy and they will forward the refill request to us. Please allow 3 business days for your refill to be completed.          Additional Information About Your Visit        MyChart Information     [facility] gives you secure access to your electronic health record. If you see a primary care provider, you can also send messages to your care team and make appointments. If you have questions, please call your primary care clinic.  If you do not have a primary care provider, please call [phone] and they will assist you.        Care EveryWhere ID     This is your Care EveryWhere ID. This could be used by other organizations to  unk "access your [place] medical records  [id number]        Your Vitals Were     Pulse Temperature Respirations Height Pulse Oximetry BMI (Body Mass Index)    86 98.2  F (36.8  C) (Tympanic) 16 5' 7\" (1.702 m) 98% 33.83 kg/m2       Blood Pressure from Last 3 Encounters:   04/03/18 133/74   03/22/18 136/64   12/06/17 135/70    Weight from Last 3 Encounters:   04/03/18 216 lb (98 kg)   03/22/18 216 lb (98 kg)   12/06/17 221 lb (100.2 kg)              Today, you had the following     No orders found for display       Primary Care Provider Office Phone # Fax #    Hamilton Romo [phone]       [address] [facility] [address]        Equal Access to Services     [facility]: Hadii aad ku hadasho Sotevin, waaxda luqadaha, qaybta kaalmada adeemeraldyada, tarik godwin . So [facility] [phone].    ATENCIÓN: Si habla español, tiene a shaver disposición servicios gratuitos de asistencia lingüística. Manasa al [phone].    We comply with applicable federal civil rights laws and [place] laws. We do not discriminate on the basis of race, color, national origin, age, disability, sex, sexual orientation, or gender identity.            Thank you!     Thank you for choosing [facility]  for your care. Our goal is always to provide you with excellent care. Hearing back from our patients is one way we can continue to improve our services. Please take a few minutes to complete the written survey that you may receive in the mail after your visit with us. Thank you!             Your Updated Medication List - Protect others around you: Learn how to safely use, store and throw away your medicines at www.disposemymeds.org.          This list is accurate as of 4/3/18 10:25 AM.  Always use your most recent med list.                   Brand Name Dispense Instructions for use Diagnosis    ALEVE PO      Take by mouth 2 times daily (with meals)        aspirin 81 MG tablet      " 1 tab po QD (Once per day)    Type II or unspecified type diabetes mellitus without mention of complication, not stated as uncontrolled, Other and unspecified hyperlipidemia       BILBERRY PO      Take by mouth daily        CALCIUM + D PO      Take  by mouth.        IBUPROFEN PO      Take by mouth every 4 hours as needed for moderate pain        lisinopril 5 MG tablet    PRINIVIL/ZESTRIL    90 tablet    Take 1 tablet (5 mg) by mouth daily    Microalbuminuria       LUTEIN PO      Take by mouth daily        metFORMIN 500 MG tablet    GLUCOPHAGE    270 tablet    Take 1 tablet (500 mg) by mouth 3 times daily (with meals) . Need appointment for further refill    Type 2 diabetes mellitus without complication, unspecified long term insulin use status (H)       MULTIVITAMIN TABS   OR      1 DAILY        pravastatin 20 MG tablet    PRAVACHOL    90 tablet    TAKE 0.5 TABLET BY MOUTH DAILY    Type 2 diabetes mellitus without complication, unspecified long term insulin use status (H), Mixed hyperlipidemia       terazosin 5 MG capsule    HYTRIN    90 capsule    TAKE 1 CAPSULE BY MOUTH NIGHTLY AT BEDTIME    Benign non-nodular prostatic hyperplasia without lower urinary tract symptoms       vitamin B complex with vitamin C Tabs tablet      Take 1 tablet by mouth daily        Vitamin D3 3000 UNITS Tabs      Take  by mouth.

## 2020-09-11 ENCOUNTER — OFFICE VISIT (OUTPATIENT)
Dept: FAMILY MEDICINE | Facility: CLINIC | Age: 80
End: 2020-09-11
Payer: MEDICARE

## 2020-09-11 VITALS
BODY MASS INDEX: 35.09 KG/M2 | SYSTOLIC BLOOD PRESSURE: 130 MMHG | DIASTOLIC BLOOD PRESSURE: 64 MMHG | HEIGHT: 67 IN | RESPIRATION RATE: 14 BRPM | WEIGHT: 223.6 LBS | OXYGEN SATURATION: 98 % | TEMPERATURE: 96.4 F | HEART RATE: 79 BPM

## 2020-09-11 DIAGNOSIS — E66.811 OBESITY (BMI 30.0-34.9): ICD-10-CM

## 2020-09-11 DIAGNOSIS — Z23 NEED FOR PROPHYLACTIC VACCINATION AND INOCULATION AGAINST INFLUENZA: ICD-10-CM

## 2020-09-11 DIAGNOSIS — E78.2 MIXED HYPERLIPIDEMIA: ICD-10-CM

## 2020-09-11 DIAGNOSIS — E11.9 TYPE 2 DIABETES MELLITUS WITHOUT COMPLICATION, WITHOUT LONG-TERM CURRENT USE OF INSULIN (H): Primary | ICD-10-CM

## 2020-09-11 DIAGNOSIS — L20.84 INTRINSIC ECZEMA: ICD-10-CM

## 2020-09-11 DIAGNOSIS — R80.9 MICROALBUMINURIA: ICD-10-CM

## 2020-09-11 DIAGNOSIS — N40.0 BENIGN NON-NODULAR PROSTATIC HYPERPLASIA WITHOUT LOWER URINARY TRACT SYMPTOMS: ICD-10-CM

## 2020-09-11 LAB
CREAT UR-MCNC: 74 MG/DL
HBA1C MFR BLD: 6.7 % (ref 0–5.6)
HGB BLD-MCNC: 13.6 G/DL (ref 13.3–17.7)
MICROALBUMIN UR-MCNC: 30 MG/L
MICROALBUMIN/CREAT UR: 40.81 MG/G CR (ref 0–17)

## 2020-09-11 PROCEDURE — 83721 ASSAY OF BLOOD LIPOPROTEIN: CPT | Performed by: FAMILY MEDICINE

## 2020-09-11 PROCEDURE — 82043 UR ALBUMIN QUANTITATIVE: CPT | Performed by: FAMILY MEDICINE

## 2020-09-11 PROCEDURE — 83036 HEMOGLOBIN GLYCOSYLATED A1C: CPT | Performed by: FAMILY MEDICINE

## 2020-09-11 PROCEDURE — 90662 IIV NO PRSV INCREASED AG IM: CPT | Performed by: FAMILY MEDICINE

## 2020-09-11 PROCEDURE — 99214 OFFICE O/P EST MOD 30 MIN: CPT | Performed by: FAMILY MEDICINE

## 2020-09-11 PROCEDURE — 80048 BASIC METABOLIC PNL TOTAL CA: CPT | Performed by: FAMILY MEDICINE

## 2020-09-11 PROCEDURE — G0008 ADMIN INFLUENZA VIRUS VAC: HCPCS | Performed by: FAMILY MEDICINE

## 2020-09-11 PROCEDURE — 84443 ASSAY THYROID STIM HORMONE: CPT | Performed by: FAMILY MEDICINE

## 2020-09-11 PROCEDURE — 85018 HEMOGLOBIN: CPT | Performed by: FAMILY MEDICINE

## 2020-09-11 PROCEDURE — 36415 COLL VENOUS BLD VENIPUNCTURE: CPT | Performed by: FAMILY MEDICINE

## 2020-09-11 PROCEDURE — 84460 ALANINE AMINO (ALT) (SGPT): CPT | Performed by: FAMILY MEDICINE

## 2020-09-11 RX ORDER — TRIAMCINOLONE ACETONIDE 1 MG/G
CREAM TOPICAL 2 TIMES DAILY
Qty: 45 G | Refills: 1 | Status: SHIPPED | OUTPATIENT
Start: 2020-09-11 | End: 2020-12-06

## 2020-09-11 RX ORDER — PRAVASTATIN SODIUM 20 MG
TABLET ORAL
Qty: 90 TABLET | Refills: 3 | Status: SHIPPED | OUTPATIENT
Start: 2020-09-11 | End: 2021-11-19

## 2020-09-11 RX ORDER — TERAZOSIN 5 MG/1
CAPSULE ORAL
Qty: 90 CAPSULE | Refills: 3 | Status: SHIPPED | OUTPATIENT
Start: 2020-09-11 | End: 2021-01-25

## 2020-09-11 ASSESSMENT — MIFFLIN-ST. JEOR: SCORE: 1674.93

## 2020-09-11 NOTE — PROGRESS NOTES
"Subjective     Vikash Arredondo II is a 79 year old male who presents to clinic today for the following health issues:    HPI       Diabetes Follow-up    How often are you checking your blood sugar? One time daily  What time of day are you checking your blood sugars (select all that apply)?  Before meals  Have you had any blood sugars above 200?  No  Have you had any blood sugars below 70?  No    What symptoms do you notice when your blood sugar is low?  Faint     What concerns do you have today about your diabetes? None     Do you have any of these symptoms? (Select all that apply)  No numbness or tingling in feet.  No redness, sores or blisters on feet.  No complaints of excessive thirst.  No reports of blurry vision.  No significant changes to weight.    Have you had a diabetic eye exam in the last 12 months? Yes- Date of last eye exam: unknown,  Location: [facility]         BP Readings from Last 2 Encounters:   09/11/20 130/64   10/23/19 128/64     Hemoglobin A1C (%)   Date Value   10/23/2019 6.6 (H)   03/21/2019 6.5 (H)     LDL Cholesterol Calculated (mg/dL)   Date Value   10/23/2019 93   10/25/2018 77                 How many servings of fruits and vegetables do you eat daily?  2-3    On average, how many sweetened beverages do you drink each day (Examples: soda, juice, sweet tea, etc.  Do NOT count diet or artificially sweetened beverages)?   0    How many days per week do you exercise enough to make your heart beat faster? 3 or less    How many minutes a day do you exercise enough to make your heart beat faster? 9 or less    How many days per week do you miss taking your medication? 0        Review of Systems   Constitutional, HEENT, cardiovascular, pulmonary, gi and gu systems are negative, except as otherwise noted.      Objective    /64   Pulse 79   Temp 96.4  F (35.8  C) (Tympanic)   Resp 14   Ht 1.689 m (5' 6.5\")   Wt 101.4 kg (223 lb 9.6 oz)   SpO2 98%   BMI 35.55 kg/m    Body mass index is " 35.55 kg/m .  Physical Exam   GENERAL: healthy, alert and no distress  EYES: Eyes grossly normal to inspection, PERRL and conjunctivae and sclerae normal  HENT: ear canals and TM's normal, nose and mouth without ulcers or lesions  NECK: no adenopathy, no asymmetry, masses, or scars and thyroid normal to palpation  RESP: lungs clear to auscultation - no rales, rhonchi or wheezes  CV: regular rate and rhythm, normal S1 S2, no S3 or S4, no murmur, click or rub, no peripheral edema and peripheral pulses strong  ABDOMEN: soft, nontender, no hepatosplenomegaly, no masses and bowel sounds normal  MS: no gross musculoskeletal defects noted, no edema  SKIN: no suspicious lesions or rashes  NEURO: Normal strength and tone, mentation intact and speech normal  BACK: no CVA tenderness, no paralumbar tenderness  PSYCH: mentation appears normal, affect normal/bright  LYMPH: no cervical, supraclavicular, axillary, or inguinal adenopathy            Assessment & Plan     Type 2 diabetes mellitus without complication, without long-term current use of insulin (H)  Stable, has no sign of clinical worsening, will keep monitoring and encouraged him to work on life style modification   - Hemoglobin  - **Basic metabolic panel FUTURE anytime  - ALT  - Hemoglobin A1c  - Albumin Random Urine Quantitative with Creat Ratio  - LDL cholesterol direct  - TSH with free T4 reflex  - metFORMIN (GLUCOPHAGE) 500 MG tablet; TAKE ONE TABLET BY MOUTH THREE TIMES DAILY WITH MEALS NEEDS AN APPOINTMENT FOR FURTHER FILLS  - pravastatin (PRAVACHOL) 20 MG tablet; TAKE 0.5 TABLET BY MOUTH DAILY    Obesity (BMI 30.0-34.9)    - Hemoglobin  - **Basic metabolic panel FUTURE anytime  - ALT  - Hemoglobin A1c  - Albumin Random Urine Quantitative with Creat Ratio  - LDL cholesterol direct  - TSH with free T4 reflex    Microalbuminuria  mentioned above   - Hemoglobin  - **Basic metabolic panel FUTURE anytime  - ALT  - Hemoglobin A1c  - Albumin Random Urine Quantitative  "with Creat Ratio  - LDL cholesterol direct  - TSH with free T4 reflex    Mixed hyperlipidemia  Keep monitoring and encouraged him to keep working on life style modification   - Hemoglobin  - **Basic metabolic panel FUTURE anytime  - ALT  - Hemoglobin A1c  - Albumin Random Urine Quantitative with Creat Ratio  - LDL cholesterol direct  - TSH with free T4 reflex  - pravastatin (PRAVACHOL) 20 MG tablet; TAKE 0.5 TABLET BY MOUTH DAILY    Benign non-nodular prostatic hyperplasia without lower urinary tract symptoms    - terazosin (HYTRIN) 5 MG capsule; TAKE ONE CAPSULE BY MOUTH ONCE DAILY AT BEDTIME    Intrinsic eczema  Has on right upper extremities, will have him to try triamcinolone cream   - triamcinolone (KENALOG) 0.1 % external cream; Apply topically 2 times daily     BMI:   Estimated body mass index is 35.55 kg/m  as calculated from the following:    Height as of this encounter: 1.689 m (5' 6.5\").    Weight as of this encounter: 101.4 kg (223 lb 9.6 oz).           FUTURE APPOINTMENTS:       - Follow-up office in 6 months for DM and general health exam     No follow-ups on file.    Hamilton Romo MD  [facility] [place]    "

## 2020-09-12 LAB
ALT SERPL W P-5'-P-CCNC: 75 U/L (ref 0–70)
ANION GAP SERPL CALCULATED.3IONS-SCNC: 6 MMOL/L (ref 3–14)
BUN SERPL-MCNC: 17 MG/DL (ref 7–30)
CALCIUM SERPL-MCNC: 9.5 MG/DL (ref 8.5–10.1)
CHLORIDE SERPL-SCNC: 104 MMOL/L (ref 94–109)
CO2 SERPL-SCNC: 24 MMOL/L (ref 20–32)
CREAT SERPL-MCNC: 1.04 MG/DL (ref 0.66–1.25)
GFR SERPL CREATININE-BSD FRML MDRD: 67 ML/MIN/{1.73_M2}
GLUCOSE SERPL-MCNC: 127 MG/DL (ref 70–99)
LDLC SERPL DIRECT ASSAY-MCNC: 115 MG/DL
POTASSIUM SERPL-SCNC: 4.4 MMOL/L (ref 3.4–5.3)
SODIUM SERPL-SCNC: 134 MMOL/L (ref 133–144)
TSH SERPL DL<=0.005 MIU/L-ACNC: 2.19 MU/L (ref 0.4–4)

## 2020-12-06 DIAGNOSIS — L20.84 INTRINSIC ECZEMA: ICD-10-CM

## 2020-12-06 RX ORDER — TRIAMCINOLONE ACETONIDE 1 MG/G
CREAM TOPICAL 2 TIMES DAILY
Qty: 45 G | Refills: 3 | Status: SHIPPED | OUTPATIENT
Start: 2020-12-06 | End: 2021-02-23

## 2021-01-15 ENCOUNTER — HEALTH MAINTENANCE LETTER (OUTPATIENT)
Age: 81
End: 2021-01-15

## 2021-01-20 DIAGNOSIS — R80.9 MICROALBUMINURIA: ICD-10-CM

## 2021-01-20 RX ORDER — LISINOPRIL 5 MG/1
TABLET ORAL
Qty: 90 TABLET | Refills: 3 | Status: SHIPPED | OUTPATIENT
Start: 2021-01-20 | End: 2022-02-19

## 2021-01-25 ENCOUNTER — OFFICE VISIT (OUTPATIENT)
Dept: FAMILY MEDICINE | Facility: CLINIC | Age: 81
End: 2021-01-25
Payer: MEDICARE

## 2021-01-25 VITALS
SYSTOLIC BLOOD PRESSURE: 136 MMHG | DIASTOLIC BLOOD PRESSURE: 78 MMHG | HEART RATE: 85 BPM | BODY MASS INDEX: 34.37 KG/M2 | WEIGHT: 219 LBS | TEMPERATURE: 97 F | HEIGHT: 67 IN | OXYGEN SATURATION: 98 %

## 2021-01-25 DIAGNOSIS — N40.0 BENIGN NON-NODULAR PROSTATIC HYPERPLASIA WITHOUT LOWER URINARY TRACT SYMPTOMS: ICD-10-CM

## 2021-01-25 DIAGNOSIS — R80.9 MICROALBUMINURIA: ICD-10-CM

## 2021-01-25 DIAGNOSIS — E11.9 TYPE 2 DIABETES MELLITUS WITHOUT COMPLICATION, WITHOUT LONG-TERM CURRENT USE OF INSULIN (H): ICD-10-CM

## 2021-01-25 DIAGNOSIS — Z12.5 SCREENING FOR PROSTATE CANCER: ICD-10-CM

## 2021-01-25 DIAGNOSIS — E78.2 MIXED HYPERLIPIDEMIA: ICD-10-CM

## 2021-01-25 DIAGNOSIS — Z00.00 HEALTH MAINTENANCE EXAMINATION: Primary | ICD-10-CM

## 2021-01-25 LAB
ERYTHROCYTE [DISTWIDTH] IN BLOOD BY AUTOMATED COUNT: 13 % (ref 10–15)
HBA1C MFR BLD: 7.2 % (ref 0–5.6)
HCT VFR BLD AUTO: 41.6 % (ref 40–53)
HGB BLD-MCNC: 14.5 G/DL (ref 13.3–17.7)
MCH RBC QN AUTO: 29.9 PG (ref 26.5–33)
MCHC RBC AUTO-ENTMCNC: 34.9 G/DL (ref 31.5–36.5)
MCV RBC AUTO: 86 FL (ref 78–100)
PLATELET # BLD AUTO: 207 10E9/L (ref 150–450)
RBC # BLD AUTO: 4.85 10E12/L (ref 4.4–5.9)
WBC # BLD AUTO: 7.7 10E9/L (ref 4–11)

## 2021-01-25 PROCEDURE — 85027 COMPLETE CBC AUTOMATED: CPT | Performed by: FAMILY MEDICINE

## 2021-01-25 PROCEDURE — G0103 PSA SCREENING: HCPCS | Performed by: FAMILY MEDICINE

## 2021-01-25 PROCEDURE — 99207 PR FOOT EXAM NO CHARGE: CPT | Performed by: FAMILY MEDICINE

## 2021-01-25 PROCEDURE — 80061 LIPID PANEL: CPT | Performed by: FAMILY MEDICINE

## 2021-01-25 PROCEDURE — 83036 HEMOGLOBIN GLYCOSYLATED A1C: CPT | Performed by: FAMILY MEDICINE

## 2021-01-25 PROCEDURE — G0439 PPPS, SUBSEQ VISIT: HCPCS | Performed by: FAMILY MEDICINE

## 2021-01-25 PROCEDURE — 80069 RENAL FUNCTION PANEL: CPT | Performed by: FAMILY MEDICINE

## 2021-01-25 PROCEDURE — 36415 COLL VENOUS BLD VENIPUNCTURE: CPT | Performed by: FAMILY MEDICINE

## 2021-01-25 RX ORDER — TERAZOSIN 5 MG/1
CAPSULE ORAL
Qty: 90 CAPSULE | Refills: 3 | Status: SHIPPED | OUTPATIENT
Start: 2021-01-25 | End: 2022-03-23

## 2021-01-25 ASSESSMENT — ACTIVITIES OF DAILY LIVING (ADL): CURRENT_FUNCTION: NO ASSISTANCE NEEDED

## 2021-01-25 ASSESSMENT — MIFFLIN-ST. JEOR: SCORE: 1649.07

## 2021-01-25 NOTE — PROGRESS NOTES
"SUBJECTIVE:   Vikash Arredondo II is a 80 year old male who presents for Preventive Visit.      Patient has been advised of split billing requirements and indicates understanding: Yes   Are you in the first 12 months of your Medicare coverage?  No    Healthy Habits:     In general, how would you rate your overall health?  Very good    Frequency of exercise:  None    Do you usually eat at least 4 servings of fruit and vegetables a day, include whole grains    & fiber and avoid regularly eating high fat or \"junk\" foods?  Yes    Taking medications regularly:  Yes    Barriers to taking medications:  None    Medication side effects:  None    Ability to successfully perform activities of daily living:  No assistance needed    Home Safety:  No safety concerns identified    Hearing Impairment:  No hearing concerns    In the past 6 months, have you been bothered by leaking of urine?  No    In general, how would you rate your overall mental or emotional health?  Excellent      PHQ-2 Total Score: 0    Additional concerns today:  No    Do you feel safe in your environment? Yes    Have you ever done Advance Care Planning? (For example, a Health Directive, POLST, or a discussion with a medical provider or your loved ones about your wishes): Yes, advance care planning is on file.       Fall risk  Fallen 2 or more times in the past year?: No  Any fall with injury in the past year?: No    Cognitive Screening   1) Repeat 3 items (Leader, Season, Table)    2) Clock draw: NORMAL  3) 3 item recall: Recalls NO objects   Results: 0 items recalled: PROBABLE COGNITIVE IMPAIRMENT, **INFORM PROVIDER**    Mini-CogTM Copyright JIM Buitrago. Licensed by the author for use in [facility]; reprinted with permission (cooper@.[email or web address]). All rights reserved.      Do you have sleep apnea, excessive snoring or daytime drowsiness?: no    Reviewed and updated as needed this visit by clinical staff   Allergies  Meds              Reviewed and " updated as needed this visit by Provider                Social History     Tobacco Use     Smoking status: Former Smoker     Packs/day: 0.50     Years: 1.00     Pack years: 0.50     Types: Cigarettes     Quit date: 1961     Years since quittin.1     Smokeless tobacco: Never Used   Substance Use Topics     Alcohol use: No     Alcohol/week: 0.0 standard drinks     If you drink alcohol do you typically have >3 drinks per day or >7 drinks per week? No    Alcohol Use 3/17/2017   Prescreen: >3 drinks/day or >7 drinks/week? The patient does not drink >3 drinks per day nor >7 drinks per week.               Current providers sharing in care for this patient include:   Patient Care Team:  Hamilton Romo MD as PCP - General (Family Practice)  Hamilton Romo MD as Assigned PCP    The following health maintenance items are reviewed in Epic and correct as of today:  Health Maintenance   Topic Date Due     ZOSTER IMMUNIZATION (1 of 2) 1989     MEDICARE ANNUAL WELLNESS VISIT  2018     DIABETIC FOOT EXAM  10/25/2019     EYE EXAM  2020     LIPID  10/23/2020     A1C  2021     BMP  2021     MICROALBUMIN  2021     FALL RISK ASSESSMENT  2021     DTAP/TDAP/TD IMMUNIZATION (3 - Td) 2022     ADVANCE CARE PLANNING  2022     COLORECTAL CANCER SCREENING  2026     PHQ-2  Completed     INFLUENZA VACCINE  Completed     Pneumococcal Vaccine: 65+ Years  Completed     Pneumococcal Vaccine: Pediatrics (0 to 5 Years) and At-Risk Patients (6 to 64 Years)  Aged Out     IPV IMMUNIZATION  Aged Out     MENINGITIS IMMUNIZATION  Aged Out     BP Readings from Last 3 Encounters:   21 136/78   20 130/64   10/23/19 128/64    Wt Readings from Last 3 Encounters:   21 99.3 kg (219 lb)   20 101.4 kg (223 lb 9.6 oz)   10/23/19 99.8 kg (220 lb)                  Patient Active Problem List   Diagnosis     BPH (benign prostatic hyperplasia)     CARDIOVASCULAR SCREENING; LDL GOAL  LESS THAN 100     Advanced directives, counseling/discussion     Obesity (BMI 30.0-34.9)     Type 2 diabetes mellitus without complications (H)     Normocytic normochromic anemia     Type 2 diabetes mellitus without complication, without long-term current use of insulin (H)     Obesity (BMI 35.0-39.9) with comorbidity (H)     Past Surgical History:   Procedure Laterality Date     C LAMINOTOMY,LUMBAR DISK,1 INTRSP       COLONOSCOPY  2002    Normal     COLONOSCOPY  07    Diverticulosis; repeat in 10 years     EXTRACAPSULAR CATARACT EXTRATION WITH INTRAOCULAR LENS IMPLANT  2010    right eye     EXTRACAPSULAR CATARACT EXTRATION WITH INTRAOCULAR LENS IMPLANT  2010    left eye     HC REPAIR INCISIONAL HERNIA,REDUCIBLE      Hernia Repair, Incisional, Unilateral     HC REPAIR OF NASAL SEPTUM       HC REPAIR ROTATOR CUFF,ACUTE  2008    left shoulder     HC VASECTOMY UNILAT/BILAT W POSTOP SEMEN  1974    Vasectomy     LAPAROSCOPIC HERNIORRHAPHY VENTRAL  2013    Procedure: LAPAROSCOPIC HERNIORRHAPHY VENTRAL;  LAPAROSCOPIC VENTRAL  RIGHT HERNIA REPAIR WITH MESH;  Surgeon: Jeanmarie Hutchison MD;  Location: [facility]       Social History     Tobacco Use     Smoking status: Former Smoker     Packs/day: 0.50     Years: 1.00     Pack years: 0.50     Types: Cigarettes     Quit date: 1961     Years since quittin.1     Smokeless tobacco: Never Used   Substance Use Topics     Alcohol use: No     Alcohol/week: 0.0 standard drinks     Family History   Problem Relation Age of Onset     Cancer - colorectal Paternal Uncle      C.A.D. Paternal Uncle      Anesthesia Reaction No family hx of      Blood Disease No family hx of      Thyroid Disease No family hx of      Eye Disorder No family hx of      Hypertension No family hx of          Current Outpatient Medications   Medication Sig Dispense Refill     lisinopril (ZESTRIL) 5 MG tablet TAKE ONE TABLET BY MOUTH ONE TIME DAILY  90 tablet 3     metFORMIN  "(GLUCOPHAGE) 500 MG tablet TAKE ONE TABLET BY MOUTH THREE TIMES DAILY WITH MEALS NEEDS AN APPOINTMENT FOR FURTHER FILLS 270 tablet 3     pravastatin (PRAVACHOL) 20 MG tablet TAKE 0.5 TABLET BY MOUTH DAILY 90 tablet 3     terazosin (HYTRIN) 5 MG capsule TAKE ONE CAPSULE BY MOUTH ONCE DAILY AT BEDTIME 90 capsule 3     triamcinolone (KENALOG) 0.1 % external cream APPLY TOPICALLY 2 TIMES DAILY 45 g 3     vitamin B complex with vitamin C (VITAMIN  B COMPLEX) TABS tablet Take 1 tablet by mouth daily       ASPIRIN 81 MG OR TABS 1 tab po QD (Once per day)       Cholecalciferol (VITAMIN D3) 3000 UNIT TABS Take  by mouth.       IBUPROFEN PO Take by mouth every 4 hours as needed for moderate pain       MULTIVITAMIN TABS   OR 1 DAILY       Allergies   Allergen Reactions     No Known Drug Allergies      Recent Labs   Lab Test 09/11/20  1238 10/23/19  1012 03/21/19  1215 10/25/18  1120 03/17/17  0942 03/17/17  0942   A1C 6.7* 6.6* 6.5* 6.2*   < > 6.3*   * 93  --  77  --  78   HDL  --  37*  --  37*  --  42   TRIG  --  126  --  129  --  141   ALT 75*  --  38 43   < > 42   CR 1.04 1.06 1.15 1.24   < > 1.07   GFRESTIMATED 67 66 60* 56*   < > 67   GFRESTBLACK 78 76 70 68   < > 81   POTASSIUM 4.4 4.3 4.4 4.8   < > 4.5   TSH 2.19 2.99  --   --    < >  --     < > = values in this interval not displayed.          Review of Systems  Constitutional, HEENT, cardiovascular, pulmonary, gi and gu systems are negative, except as otherwise noted.    OBJECTIVE:   /78   Pulse 85   Temp 97  F (36.1  C) (Tympanic)   Ht 1.689 m (5' 6.5\")   Wt 99.3 kg (219 lb)   SpO2 98%   BMI 34.82 kg/m   Estimated body mass index is 34.82 kg/m  as calculated from the following:    Height as of this encounter: 1.689 m (5' 6.5\").    Weight as of this encounter: 99.3 kg (219 lb).  Physical Exam  GENERAL: healthy, alert and no distress  EYES: Eyes grossly normal to inspection, PERRL and conjunctivae and sclerae normal  HENT: ear canals and TM's normal, " nose and mouth without ulcers or lesions  NECK: no adenopathy, no asymmetry, masses, or scars and thyroid normal to palpation  RESP: lungs clear to auscultation - no rales, rhonchi or wheezes  CV: regular rate and rhythm, normal S1 S2, no S3 or S4, no murmur, click or rub, no peripheral edema and peripheral pulses strong  ABDOMEN: soft, nontender, no hepatosplenomegaly, no masses and bowel sounds normal  MS: no gross musculoskeletal defects noted, no edema  SKIN: no suspicious lesions or rashes  NEURO: Normal strength and tone, mentation intact and speech normal  LYMPH: no cervical, supraclavicular, axillary, or inguinal adenopathy  Diabetic foot exam: normal DP and PT pulses, no trophic changes or ulcerative lesions and normal sensory exam    Diagnostic Test Results:  Labs reviewed in Epic    ASSESSMENT / PLAN:       ICD-10-CM    1. Health maintenance examination  Z00.00 CBC with platelets     Lipid panel reflex to direct LDL Fasting     Hemoglobin A1c     PSA, screen     Renal panel (Alb, BUN, Ca, Cl, CO2, Creat, Gluc, Phos, K, Na)     FOOT EXAM   2. Screening for prostate cancer  Z12.5 PSA, screen   3. Microalbuminuria  R80.9 CBC with platelets     Lipid panel reflex to direct LDL Fasting     Hemoglobin A1c     Renal panel (Alb, BUN, Ca, Cl, CO2, Creat, Gluc, Phos, K, Na)   4. Type 2 diabetes mellitus without complication, without long-term current use of insulin (H)  E11.9 CBC with platelets     Lipid panel reflex to direct LDL Fasting     Hemoglobin A1c     Renal panel (Alb, BUN, Ca, Cl, CO2, Creat, Gluc, Phos, K, Na)     FOOT EXAM   5. Mixed hyperlipidemia  E78.2 CBC with platelets     Lipid panel reflex to direct LDL Fasting     Hemoglobin A1c     Renal panel (Alb, BUN, Ca, Cl, CO2, Creat, Gluc, Phos, K, Na)   6. Benign non-nodular prostatic hyperplasia without lower urinary tract symptoms  N40.0 terazosin (HYTRIN) 5 MG capsule       Patient has been advised of split billing requirements and indicates  "understanding: Yes  COUNSELING:  Reviewed preventive health counseling, as reflected in patient instructions    Estimated body mass index is 35.55 kg/m  as calculated from the following:    Height as of 9/11/20: 1.689 m (5' 6.5\").    Weight as of 9/11/20: 101.4 kg (223 lb 9.6 oz).        He reports that he quit smoking about 60 years ago. His smoking use included cigarettes. He has a 0.50 pack-year smoking history. He has never used smokeless tobacco.      Appropriate preventive services were discussed with this patient, including applicable screening as appropriate for cardiovascular disease, diabetes, osteopenia/osteoporosis, and glaucoma.  As appropriate for age/gender, discussed screening for colorectal cancer, prostate cancer, breast cancer, and cervical cancer. Checklist reviewing preventive services available has been given to the patient.    Reviewed patients plan of care and provided an AVS. The Basic Care Plan (routine screening as documented in Health Maintenance) for Vikash meets the Care Plan requirement. This Care Plan has been established and reviewed with the Patient.    Counseling Resources:  ATP IV Guidelines  Pooled Cohorts Equation Calculator  Breast Cancer Risk Calculator  Breast Cancer: Medication to Reduce Risk  FRAX Risk Assessment  ICSI Preventive Guidelines  Dietary Guidelines for Americans, 2010  [employer]'s MyPlate  ASA Prophylaxis  Lung CA Screening    Hamilton Romo MD  [facility]    Identified Health Risks:  "

## 2021-01-26 LAB
ALBUMIN SERPL-MCNC: 3.7 G/DL (ref 3.4–5)
ANION GAP SERPL CALCULATED.3IONS-SCNC: 5 MMOL/L (ref 3–14)
BUN SERPL-MCNC: 22 MG/DL (ref 7–30)
CALCIUM SERPL-MCNC: 10.1 MG/DL (ref 8.5–10.1)
CHLORIDE SERPL-SCNC: 104 MMOL/L (ref 94–109)
CHOLEST SERPL-MCNC: 188 MG/DL
CO2 SERPL-SCNC: 25 MMOL/L (ref 20–32)
CREAT SERPL-MCNC: 1.05 MG/DL (ref 0.66–1.25)
GFR SERPL CREATININE-BSD FRML MDRD: 66 ML/MIN/{1.73_M2}
GLUCOSE SERPL-MCNC: 125 MG/DL (ref 70–99)
HDLC SERPL-MCNC: 50 MG/DL
LDLC SERPL CALC-MCNC: 110 MG/DL
NONHDLC SERPL-MCNC: 138 MG/DL
PHOSPHATE SERPL-MCNC: 3.4 MG/DL (ref 2.5–4.5)
POTASSIUM SERPL-SCNC: 4.7 MMOL/L (ref 3.4–5.3)
PSA SERPL-ACNC: 0.68 UG/L (ref 0–4)
SODIUM SERPL-SCNC: 134 MMOL/L (ref 133–144)
TRIGL SERPL-MCNC: 138 MG/DL

## 2021-02-19 ENCOUNTER — IMMUNIZATION (OUTPATIENT)
Dept: NURSING | Facility: CLINIC | Age: 81
End: 2021-02-19
Payer: MEDICARE

## 2021-02-19 PROCEDURE — 91300 PR COVID VAC PFIZER DIL RECON 30 MCG/0.3 ML IM: CPT

## 2021-02-19 PROCEDURE — 0001A PR COVID VAC PFIZER DIL RECON 30 MCG/0.3 ML IM: CPT

## 2021-02-23 DIAGNOSIS — L20.84 INTRINSIC ECZEMA: ICD-10-CM

## 2021-02-23 RX ORDER — TRIAMCINOLONE ACETONIDE 1 MG/G
CREAM TOPICAL 2 TIMES DAILY
Qty: 45 G | Refills: 2 | Status: SHIPPED | OUTPATIENT
Start: 2021-02-23 | End: 2021-03-11

## 2021-03-11 ENCOUNTER — TELEPHONE (OUTPATIENT)
Dept: FAMILY MEDICINE | Facility: CLINIC | Age: 81
End: 2021-03-11

## 2021-03-11 ENCOUNTER — OFFICE VISIT (OUTPATIENT)
Dept: FAMILY MEDICINE | Facility: CLINIC | Age: 81
End: 2021-03-11
Payer: MEDICARE

## 2021-03-11 VITALS
OXYGEN SATURATION: 98 % | BODY MASS INDEX: 34.69 KG/M2 | HEIGHT: 67 IN | SYSTOLIC BLOOD PRESSURE: 132 MMHG | TEMPERATURE: 97.9 F | HEART RATE: 88 BPM | DIASTOLIC BLOOD PRESSURE: 80 MMHG | WEIGHT: 221 LBS

## 2021-03-11 DIAGNOSIS — E66.01 MORBID OBESITY (H): ICD-10-CM

## 2021-03-11 DIAGNOSIS — L50.9 URTICARIA: Primary | ICD-10-CM

## 2021-03-11 DIAGNOSIS — L30.9 DERMATITIS: ICD-10-CM

## 2021-03-11 PROCEDURE — 99213 OFFICE O/P EST LOW 20 MIN: CPT | Performed by: NURSE PRACTITIONER

## 2021-03-11 RX ORDER — PREDNISONE 20 MG/1
TABLET ORAL
Qty: 13 TABLET | Refills: 0 | Status: SHIPPED | OUTPATIENT
Start: 2021-03-11 | End: 2023-11-02

## 2021-03-11 RX ORDER — TRIAMCINOLONE ACETONIDE 1 MG/G
CREAM TOPICAL 2 TIMES DAILY
Qty: 45 G | Refills: 2 | Status: SHIPPED | OUTPATIENT
Start: 2021-03-11 | End: 2023-11-02

## 2021-03-11 RX ORDER — HYDROXYZINE PAMOATE 25 MG/1
25 CAPSULE ORAL 3 TIMES DAILY PRN
Qty: 30 CAPSULE | Refills: 1 | Status: SHIPPED | OUTPATIENT
Start: 2021-03-11 | End: 2023-11-02

## 2021-03-11 ASSESSMENT — MIFFLIN-ST. JEOR: SCORE: 1666.08

## 2021-03-11 NOTE — PATIENT INSTRUCTIONS
1. Cool shower / bath  2. Zyrtec in the morning and Claritin at night  3. Hydroxyzine three times a day  4. Steroid taper  5. Calamine    6. See skin clinic if this doesn't improve

## 2021-03-11 NOTE — PROGRESS NOTES
Assessment & Plan     Urticaria  - predniSONE (DELTASONE) 20 MG tablet  Dispense: 13 tablet; Refill: 0  - hydrOXYzine (VISTARIL) 25 MG capsule  Dispense: 30 capsule; Refill: 1  - SKIN CARE REFERRAL    Dermatitis  - triamcinolone (KENALOG) 0.1 % external cream  Dispense: 45 g; Refill: 2  - SKIN CARE REFERRAL    Morbid obesity (H)  Comment: Working on diet and exercise.     Comment: Etiology unclear. No airway involvement, breathing compromise, or lip / tongue swelling. Rash spares face. No constitutional symptoms to prompt suspicion of viral exanthem. Likely irritant or allergic contact dermatitis of unknown trigger. Will treat with cool baths, calamine, hydroxyzine, Claritin, Zyrtec, and prednisone. May use triamcinolone, as well, on dense areas. Ordered referral to skin clinic, as well, so he can follow up with them if this persists beyond our steroid taper period (8 days). Discussed reasons to call or return to clinic. TISH acknowledges and demonstrates understanding of circumstances under which care should be sought urgently or emergently. Follow up as discussed. Discussed risks, benefits, alternatives, potential side effects, and proper administration of new medication / treatment. Agrees with plan of care. All questions answered.     See Patient Instructions    Return in about 1 week (around 3/18/2021) for persistent or worsening symptoms.    Moisés Werner NP  [facility] LEWIS WINSTON is a 80 year old who presents for the following health issues       Concern - Itching all over body  Onset: couple weeks  Description: constant - issues sleeping regarding ongoing itching  Intensity: severe  Progression of Symptoms:  worsening  Accompanying Signs & Symptoms: None  Previous history of similar problem: no  Precipitating factors:        Worsened by: heat  Alleviating factors:        Improved by: lukewarm is best  Therapies tried and outcome: Anti Itch creams, benadryl  "medication OTC    Patient would like to hold off vaccines as he has COVID vaccine #2 tomorrow    HPI: Bijan presents today with the complaint of whole body itchy red welts / rash. This started in a small localized area of his right upper arm a few weeks ago and has since spread to his entire body with the exception of his face. He lives alone and, to his knowledge, has not been exposed to any new products, fabrics, insects, etc.     He did get his first COVID vaccine a couple weeks ago, but this rash was present in its early stages before that.     No lip / tongue swelling, wheeze, perception of airway closing.    Rash is so itchy that he cannot sleep.     Has tried triamcinolone and several other OTC anti-itch preparations.     Review of Systems   Constitutional, HEENT, pulmonary, neuro, skin systems are negative, except as otherwise noted.      Objective    /80   Pulse 88   Temp 97.9  F (36.6  C) (Tympanic)   Ht 1.702 m (5' 7\")   Wt 100.2 kg (221 lb)   SpO2 98%   BMI 34.61 kg/m    Body mass index is 34.61 kg/m .  Physical Exam   GENERAL: healthy, alert and no distress  RESP: lungs clear to auscultation - no rales, rhonchi or wheezes  CV: regular rate and rhythm, normal S1 S2, no S3 or S4, no murmur, click or rub, no peripheral edema and peripheral pulses strong  SKIN: Large confluent areas of raised urticarial wheals with clear excoriative stigmata over back, groin, legs, arms, neck, and abdomen. No bleeding or drainage.   NEURO: Normal strength and tone, mentation intact and speech normal  PSYCH: mentation appears normal, affect normal/bright                "

## 2021-03-12 NOTE — TELEPHONE ENCOUNTER
Per David,    Provider wished for patient to postpone COVID vaccine - Pfizer for 3-3 1/2 weeks.     Contacted patient and cancelled his appointment, informed him to contact our COVID line to reschedule this in 3-3 1/2 weeks, that they normally get shipments in on Tuesday AM and then the slots are available based on the shipment amount. Patient has the number to contact us or the COVID vaccine line. He acknowledged and will let us know.

## 2021-03-16 ENCOUNTER — NURSE TRIAGE (OUTPATIENT)
Dept: NURSING | Facility: CLINIC | Age: 81
End: 2021-03-16

## 2021-03-16 NOTE — TELEPHONE ENCOUNTER
Wants to schedule his 2nd dose of Covid vaccine. Warm transferred to Covid vaccine scheduling line.     Additional Information    Negative: Nursing judgment    Negative: Nursing judgment    Negative: Nursing judgment    Negative: Nursing judgment    Information only question and nurse able to answer    Protocols used: INFORMATION ONLY CALL - NO TRIAGE-A-[place]       Home

## 2021-03-22 NOTE — TELEPHONE ENCOUNTER
Can we just call to make sure he was able to schedule his second COVID vaccine (we had to postpone it due to another illness). Thanks.

## 2021-03-23 ENCOUNTER — IMMUNIZATION (OUTPATIENT)
Dept: NURSING | Facility: CLINIC | Age: 81
End: 2021-03-23
Attending: INTERNAL MEDICINE
Payer: MEDICARE

## 2021-03-23 PROCEDURE — 91300 PR COVID VAC PFIZER DIL RECON 30 MCG/0.3 ML IM: CPT

## 2021-03-23 PROCEDURE — 0002A PR COVID VAC PFIZER DIL RECON 30 MCG/0.3 ML IM: CPT

## 2021-03-23 NOTE — TELEPHONE ENCOUNTER
Called pt and scheduled an appt for today at 3:10 at [facility]  Dali Hartman,

## 2021-07-14 ENCOUNTER — ALLIED HEALTH/NURSE VISIT (OUTPATIENT)
Dept: NURSING | Facility: CLINIC | Age: 81
End: 2021-07-14
Payer: MEDICARE

## 2021-07-14 DIAGNOSIS — Z23 NEED FOR VACCINATION: Primary | ICD-10-CM

## 2021-07-14 PROCEDURE — 90750 HZV VACC RECOMBINANT IM: CPT

## 2021-07-14 PROCEDURE — 99207 PR NO CHARGE NURSE ONLY: CPT

## 2021-07-14 PROCEDURE — 90471 IMMUNIZATION ADMIN: CPT

## 2021-09-04 ENCOUNTER — HEALTH MAINTENANCE LETTER (OUTPATIENT)
Age: 81
End: 2021-09-04

## 2021-11-18 ENCOUNTER — TRANSFERRED RECORDS (OUTPATIENT)
Dept: HEALTH INFORMATION MANAGEMENT | Facility: CLINIC | Age: 81
End: 2021-11-18
Payer: MEDICARE

## 2021-11-18 ENCOUNTER — TELEPHONE (OUTPATIENT)
Dept: FAMILY MEDICINE | Facility: CLINIC | Age: 81
End: 2021-11-18
Payer: MEDICARE

## 2021-11-18 LAB — RETINOPATHY: NORMAL

## 2021-11-18 NOTE — TELEPHONE ENCOUNTER
Dr. Simran Henry eye md calling to let Dr. Romo know that pt's eyes look good regarding diabetes.  She did notice that the left pupil is 1-2 mm larger than the right and wanted to let Dr. Romo be aware of this.  This was an incidental finding.    Dr. Simran Benson can be reached at [phone] office number.    Ronda GERMAN RN  EP Triage

## 2021-11-19 ENCOUNTER — OFFICE VISIT (OUTPATIENT)
Dept: FAMILY MEDICINE | Facility: CLINIC | Age: 81
End: 2021-11-19
Payer: MEDICARE

## 2021-11-19 VITALS
WEIGHT: 210 LBS | TEMPERATURE: 97.4 F | HEIGHT: 67 IN | SYSTOLIC BLOOD PRESSURE: 134 MMHG | DIASTOLIC BLOOD PRESSURE: 72 MMHG | RESPIRATION RATE: 16 BRPM | BODY MASS INDEX: 32.96 KG/M2 | HEART RATE: 92 BPM | OXYGEN SATURATION: 98 %

## 2021-11-19 DIAGNOSIS — R80.9 MICROALBUMINURIA: ICD-10-CM

## 2021-11-19 DIAGNOSIS — E78.2 MIXED HYPERLIPIDEMIA: ICD-10-CM

## 2021-11-19 DIAGNOSIS — H57.02 ANISOCORIA: ICD-10-CM

## 2021-11-19 DIAGNOSIS — E11.9 TYPE 2 DIABETES MELLITUS WITHOUT COMPLICATION, WITHOUT LONG-TERM CURRENT USE OF INSULIN (H): Primary | ICD-10-CM

## 2021-11-19 DIAGNOSIS — E66.01 MORBID OBESITY (H): ICD-10-CM

## 2021-11-19 LAB — HBA1C MFR BLD: 7 % (ref 0–5.6)

## 2021-11-19 PROCEDURE — 83036 HEMOGLOBIN GLYCOSYLATED A1C: CPT | Performed by: FAMILY MEDICINE

## 2021-11-19 PROCEDURE — 36415 COLL VENOUS BLD VENIPUNCTURE: CPT | Performed by: FAMILY MEDICINE

## 2021-11-19 PROCEDURE — 83721 ASSAY OF BLOOD LIPOPROTEIN: CPT | Performed by: FAMILY MEDICINE

## 2021-11-19 PROCEDURE — 84460 ALANINE AMINO (ALT) (SGPT): CPT | Performed by: FAMILY MEDICINE

## 2021-11-19 PROCEDURE — 82043 UR ALBUMIN QUANTITATIVE: CPT | Performed by: FAMILY MEDICINE

## 2021-11-19 PROCEDURE — 99214 OFFICE O/P EST MOD 30 MIN: CPT | Performed by: FAMILY MEDICINE

## 2021-11-19 PROCEDURE — 80048 BASIC METABOLIC PNL TOTAL CA: CPT | Performed by: FAMILY MEDICINE

## 2021-11-19 RX ORDER — PRAVASTATIN SODIUM 20 MG
TABLET ORAL
Qty: 90 TABLET | Refills: 3 | Status: SHIPPED | OUTPATIENT
Start: 2021-11-19 | End: 2022-03-23

## 2021-11-19 ASSESSMENT — PAIN SCALES - GENERAL: PAINLEVEL: NO PAIN (0)

## 2021-11-19 ASSESSMENT — MIFFLIN-ST. JEOR: SCORE: 1616.18

## 2021-11-19 NOTE — TELEPHONE ENCOUNTER
Pt was checked and discussed about anisocoria. We will monitor and consider brain MRA/MRI if sx worsening  thx

## 2021-11-19 NOTE — PROGRESS NOTES
"  Assessment & Plan     Type 2 diabetes mellitus without complication, without long-term current use of insulin (H)  Stable, continue working on life style modification and continue taking prescribed medicine   - HEMOGLOBIN A1C; Future  - Albumin Random Urine Quantitative with Creat Ratio; Future  - Basic metabolic panel  (Ca, Cl, CO2, Creat, Gluc, K, Na, BUN); Future  - ALT; Future  - LDL cholesterol direct; Future  - pravastatin (PRAVACHOL) 20 MG tablet; TAKE 0.5 TABLET BY MOUTH DAILY  - metFORMIN (GLUCOPHAGE) 1000 MG tablet; Take 1 tablet (1,000 mg) by mouth 2 times daily (with meals)    Microalbuminuria  Stable,  Continue monitoring   - HEMOGLOBIN A1C; Future  - Albumin Random Urine Quantitative with Creat Ratio; Future  - Basic metabolic panel  (Ca, Cl, CO2, Creat, Gluc, K, Na, BUN); Future  - ALT; Future  - LDL cholesterol direct; Future    Morbid obesity (H)  encouraged him to continue working on life style modification   - HEMOGLOBIN A1C; Future  - Albumin Random Urine Quantitative with Creat Ratio; Future  - Basic metabolic panel  (Ca, Cl, CO2, Creat, Gluc, K, Na, BUN); Future  - ALT; Future  - LDL cholesterol direct; Future    Mixed hyperlipidemia    - HEMOGLOBIN A1C; Future  - Albumin Random Urine Quantitative with Creat Ratio; Future  - Basic metabolic panel  (Ca, Cl, CO2, Creat, Gluc, K, Na, BUN); Future  - ALT; Future  - LDL cholesterol direct; Future  - pravastatin (PRAVACHOL) 20 MG tablet; TAKE 0.5 TABLET BY MOUTH DAILY      Anisocoria  Comment: left pupil is slightly sluggish, other neurologic exam were all stable, had recent Bell's palsy  Will continue monitoring and consider brain MRI/MRA if worsening    Plan: mentioned above, pt acknowledged and agreed with the plan            BMI:   Estimated body mass index is 32.89 kg/m  as calculated from the following:    Height as of this encounter: 1.702 m (5' 7\").    Weight as of this encounter: 95.3 kg (210 lb).   Weight management plan: Discussed " healthy diet and exercise guidelines        No follow-ups on file.    Hamilton Romo MD  [facility] [place] is a 80 year old who presents for the following health issues     HPI     Diabetes Follow-up    How often are you checking your blood sugar? One time daily  What time of day are you checking your blood sugars (select all that apply)?  Before meals  Have you had any blood sugars above 200?  No  Have you had any blood sugars below 70?  No    What symptoms do you notice when your blood sugar is low?  None    What concerns do you have today about your diabetes? None     Do you have any of these symptoms? (Select all that apply)  No numbness or tingling in feet.  No redness, sores or blisters on feet.  No complaints of excessive thirst.  No reports of blurry vision.  No significant changes to weight.    Have you had a diabetic eye exam in the last 12 months? Yes- Date of last eye exam: 11/18/2021,  Location: [facility] [place]        BP Readings from Last 2 Encounters:   11/19/21 134/72   03/11/21 132/80     Hemoglobin A1C (%)   Date Value   01/25/2021 7.2 (H)   09/11/2020 6.7 (H)     LDL Cholesterol Calculated (mg/dL)   Date Value   01/25/2021 110 (H)   10/23/2019 93     LDL Cholesterol Direct (mg/dL)   Date Value   09/11/2020 115 (H)               Hyperlipidemia Follow-Up      Are you regularly taking any medication or supplement to lower your cholesterol?   Yes- Pravastatin    Are you having muscle aches or other side effects that you think could be caused by your cholesterol lowering medication?  No      How many servings of fruits and vegetables do you eat daily?  2-3    On average, how many sweetened beverages do you drink each day (Examples: soda, juice, sweet tea, etc.  Do NOT count diet or artificially sweetened beverages)?   0    How many days per week do you exercise enough to make your heart beat faster? 3 or less    How many minutes a day do you  "exercise enough to make your heart beat faster? 9 or less    How many days per week do you miss taking your medication? 0        Review of Systems   Constitutional, HEENT, cardiovascular, pulmonary, gi and gu systems are negative, except as otherwise noted.      Objective    /72   Pulse 92   Temp 97.4  F (36.3  C) (Tympanic)   Resp 16   Ht 1.702 m (5' 7\")   Wt 95.3 kg (210 lb)   SpO2 98%   BMI 32.89 kg/m    Body mass index is 32.89 kg/m .  Physical Exam   GENERAL: healthy, alert and no distress  EYES: Eyes grossly normal to inspection, PERRL and conjunctivae and sclerae normal  HENT: ear canals and TM's normal, nose and mouth without ulcers or lesions  NECK: no adenopathy, no asymmetry, masses, or scars and thyroid normal to palpation  RESP: lungs clear to auscultation - no rales, rhonchi or wheezes  CV: regular rate and rhythm, normal S1 S2, no S3 or S4, no murmur, click or rub, no peripheral edema and peripheral pulses strong  ABDOMEN: soft, nontender, no hepatosplenomegaly, no masses and bowel sounds normal  MS: no gross musculoskeletal defects noted, no edema                "

## 2021-11-20 LAB
ALT SERPL W P-5'-P-CCNC: 41 U/L (ref 0–70)
ANION GAP SERPL CALCULATED.3IONS-SCNC: 9 MMOL/L (ref 3–14)
BUN SERPL-MCNC: 28 MG/DL (ref 7–30)
CALCIUM SERPL-MCNC: 9.2 MG/DL (ref 8.5–10.1)
CHLORIDE BLD-SCNC: 104 MMOL/L (ref 94–109)
CO2 SERPL-SCNC: 23 MMOL/L (ref 20–32)
CREAT SERPL-MCNC: 1 MG/DL (ref 0.66–1.25)
CREAT UR-MCNC: 92 MG/DL
GFR SERPL CREATININE-BSD FRML MDRD: 70 ML/MIN/1.73M2
GLUCOSE BLD-MCNC: 137 MG/DL (ref 70–99)
LDLC SERPL CALC-MCNC: 109 MG/DL
MICROALBUMIN UR-MCNC: 48 MG/L
MICROALBUMIN/CREAT UR: 52.17 MG/G CR (ref 0–17)
POTASSIUM BLD-SCNC: 4.1 MMOL/L (ref 3.4–5.3)
SODIUM SERPL-SCNC: 136 MMOL/L (ref 133–144)

## 2022-02-19 DIAGNOSIS — R80.9 MICROALBUMINURIA: ICD-10-CM

## 2022-02-19 RX ORDER — LISINOPRIL 5 MG/1
TABLET ORAL
Qty: 90 TABLET | Refills: 1 | Status: SHIPPED | OUTPATIENT
Start: 2022-02-19 | End: 2023-11-06

## 2022-03-23 ENCOUNTER — OFFICE VISIT (OUTPATIENT)
Dept: FAMILY MEDICINE | Facility: CLINIC | Age: 82
End: 2022-03-23
Payer: MEDICARE

## 2022-03-23 VITALS
HEART RATE: 84 BPM | RESPIRATION RATE: 16 BRPM | BODY MASS INDEX: 32.96 KG/M2 | TEMPERATURE: 97.7 F | SYSTOLIC BLOOD PRESSURE: 138 MMHG | HEIGHT: 67 IN | DIASTOLIC BLOOD PRESSURE: 62 MMHG | WEIGHT: 210 LBS | OXYGEN SATURATION: 100 %

## 2022-03-23 DIAGNOSIS — Z00.00 HEALTHCARE MAINTENANCE: Primary | ICD-10-CM

## 2022-03-23 DIAGNOSIS — Z13.220 SCREENING FOR HYPERLIPIDEMIA: ICD-10-CM

## 2022-03-23 DIAGNOSIS — Z12.5 SCREENING FOR PROSTATE CANCER: ICD-10-CM

## 2022-03-23 DIAGNOSIS — N40.0 BENIGN NON-NODULAR PROSTATIC HYPERPLASIA WITHOUT LOWER URINARY TRACT SYMPTOMS: ICD-10-CM

## 2022-03-23 DIAGNOSIS — E78.2 MIXED HYPERLIPIDEMIA: ICD-10-CM

## 2022-03-23 DIAGNOSIS — E11.9 TYPE 2 DIABETES MELLITUS WITHOUT COMPLICATION, WITHOUT LONG-TERM CURRENT USE OF INSULIN (H): ICD-10-CM

## 2022-03-23 DIAGNOSIS — N18.2 CHRONIC KIDNEY DISEASE, STAGE 2 (MILD): ICD-10-CM

## 2022-03-23 PROBLEM — E66.01 MORBID OBESITY (H): Status: RESOLVED | Noted: 2019-01-09 | Resolved: 2022-03-23

## 2022-03-23 LAB
ALBUMIN SERPL-MCNC: 3.6 G/DL (ref 3.4–5)
ALP SERPL-CCNC: 144 U/L (ref 40–150)
ALT SERPL W P-5'-P-CCNC: 34 U/L (ref 0–70)
ANION GAP SERPL CALCULATED.3IONS-SCNC: 7 MMOL/L (ref 3–14)
AST SERPL W P-5'-P-CCNC: 20 U/L (ref 0–45)
BILIRUB SERPL-MCNC: 0.4 MG/DL (ref 0.2–1.3)
BUN SERPL-MCNC: 23 MG/DL (ref 7–30)
CALCIUM SERPL-MCNC: 9.5 MG/DL (ref 8.5–10.1)
CHLORIDE BLD-SCNC: 104 MMOL/L (ref 94–109)
CHOLEST SERPL-MCNC: 132 MG/DL
CO2 SERPL-SCNC: 24 MMOL/L (ref 20–32)
CREAT SERPL-MCNC: 1.14 MG/DL (ref 0.66–1.25)
ERYTHROCYTE [DISTWIDTH] IN BLOOD BY AUTOMATED COUNT: 13.3 % (ref 10–15)
FASTING STATUS PATIENT QL REPORTED: NO
GFR SERPL CREATININE-BSD FRML MDRD: 64 ML/MIN/1.73M2
GLUCOSE BLD-MCNC: 133 MG/DL (ref 70–99)
HBA1C MFR BLD: 6.9 % (ref 0–5.6)
HCT VFR BLD AUTO: 41.1 % (ref 40–53)
HDLC SERPL-MCNC: 34 MG/DL
HGB BLD-MCNC: 13.8 G/DL (ref 13.3–17.7)
LDLC SERPL CALC-MCNC: 82 MG/DL
MCH RBC QN AUTO: 29.1 PG (ref 26.5–33)
MCHC RBC AUTO-ENTMCNC: 33.6 G/DL (ref 31.5–36.5)
MCV RBC AUTO: 87 FL (ref 78–100)
NONHDLC SERPL-MCNC: 98 MG/DL
PLATELET # BLD AUTO: 216 10E3/UL (ref 150–450)
POTASSIUM BLD-SCNC: 4.4 MMOL/L (ref 3.4–5.3)
PROT SERPL-MCNC: 7.6 G/DL (ref 6.8–8.8)
PSA SERPL-MCNC: 0.88 UG/L (ref 0–4)
RBC # BLD AUTO: 4.74 10E6/UL (ref 4.4–5.9)
SODIUM SERPL-SCNC: 135 MMOL/L (ref 133–144)
TRIGL SERPL-MCNC: 80 MG/DL
WBC # BLD AUTO: 9.1 10E3/UL (ref 4–11)

## 2022-03-23 PROCEDURE — 80053 COMPREHEN METABOLIC PANEL: CPT | Performed by: FAMILY MEDICINE

## 2022-03-23 PROCEDURE — G0103 PSA SCREENING: HCPCS | Performed by: FAMILY MEDICINE

## 2022-03-23 PROCEDURE — 85027 COMPLETE CBC AUTOMATED: CPT | Performed by: FAMILY MEDICINE

## 2022-03-23 PROCEDURE — 99397 PER PM REEVAL EST PAT 65+ YR: CPT | Performed by: FAMILY MEDICINE

## 2022-03-23 PROCEDURE — 80061 LIPID PANEL: CPT | Performed by: FAMILY MEDICINE

## 2022-03-23 PROCEDURE — 36415 COLL VENOUS BLD VENIPUNCTURE: CPT | Performed by: FAMILY MEDICINE

## 2022-03-23 PROCEDURE — 83036 HEMOGLOBIN GLYCOSYLATED A1C: CPT | Performed by: FAMILY MEDICINE

## 2022-03-23 RX ORDER — PRAVASTATIN SODIUM 20 MG
TABLET ORAL
Qty: 90 TABLET | Refills: 3 | Status: SHIPPED | OUTPATIENT
Start: 2022-03-23 | End: 2023-11-06

## 2022-03-23 RX ORDER — TERAZOSIN 5 MG/1
CAPSULE ORAL
Qty: 90 CAPSULE | Refills: 3 | Status: SHIPPED | OUTPATIENT
Start: 2022-03-23 | End: 2023-11-06

## 2022-03-23 ASSESSMENT — ENCOUNTER SYMPTOMS
JOINT SWELLING: 0
PALPITATIONS: 0
HEMATURIA: 0
ARTHRALGIAS: 0
NERVOUS/ANXIOUS: 0
EYE PAIN: 0
CONSTIPATION: 0
HEARTBURN: 0
NAUSEA: 0
MYALGIAS: 0
HEADACHES: 0
DYSURIA: 0
DIZZINESS: 0
PARESTHESIAS: 0
FEVER: 0
SORE THROAT: 0
FREQUENCY: 0
ABDOMINAL PAIN: 0
DIARRHEA: 0
WEAKNESS: 0
HEMATOCHEZIA: 0
COUGH: 0
SHORTNESS OF BREATH: 0
CHILLS: 0

## 2022-03-23 ASSESSMENT — PAIN SCALES - GENERAL: PAINLEVEL: NO PAIN (0)

## 2022-03-23 ASSESSMENT — ACTIVITIES OF DAILY LIVING (ADL): CURRENT_FUNCTION: NO ASSISTANCE NEEDED

## 2022-03-23 NOTE — PROGRESS NOTES
"SUBJECTIVE:   Vikash Arredondo II is a 81 year old male who presents for Preventive Visit.    Are you in the first 12 months of your Medicare coverage?  No    Healthy Habits:     In general, how would you rate your overall health?  Good    Frequency of exercise:  1 day/week    Duration of exercise:  15-30 minutes    Do you usually eat at least 4 servings of fruit and vegetables a day, include whole grains    & fiber and avoid regularly eating high fat or \"junk\" foods?  Yes    Taking medications regularly:  Yes    Medication side effects:  None    Ability to successfully perform activities of daily living:  No assistance needed    Home Safety:  No safety concerns identified    Hearing Impairment:  No hearing concerns    In the past 6 months, have you been bothered by leaking of urine?  No    In general, how would you rate your overall mental or emotional health?  Good      PHQ-2 Total Score: 0    Additional concerns today:  No    Do you feel safe in your environment? Yes    Have you ever done Advance Care Planning? (For example, a Health Directive, POLST, or a discussion with a medical provider or your loved ones about your wishes): Yes, advance care planning is on file.       Fall risk  Fallen 2 or more times in the past year?: (P) No  Any fall with injury in the past year?: (P) No    Cognitive Screening   1) Repeat 3 items (Leader, Season, Table)    2) Clock draw: NORMAL  3) 3 item recall: Recalls NO objects   Results: NORMAL clock, 1-2 items recalled: COGNITIVE IMPAIRMENT LESS LIKELY    Mini-CogTM Copyright JIM Buitrago. Licensed by the author for use in [facility]; reprinted with permission (cooper@.[facility]). All rights reserved.      Do you have sleep apnea, excessive snoring or daytime drowsiness?: no    Reviewed and updated as needed this visit by clinical staff                  Reviewed and updated as needed this visit by Provider                 Social History     Tobacco Use     Smoking status: " Former Smoker     Packs/day: 0.50     Years: 1.00     Pack years: 0.50     Types: Cigarettes     Quit date: 1961     Years since quittin.2     Smokeless tobacco: Never Used   Substance Use Topics     Alcohol use: No     Alcohol/week: 0.0 standard drinks     If you drink alcohol do you typically have >3 drinks per day or >7 drinks per week? No    Alcohol Use 2021   Prescreen: >3 drinks/day or >7 drinks/week? No       Current providers sharing in care for this patient include:   Patient Care Team:  Hamilton Romo MD as PCP - General (Family Practice)  Hamilton Romo MD as Assigned PCP    The following health maintenance items are reviewed in Epic and correct as of today:  Health Maintenance Due   Topic Date Due     PHQ-2 (once per calendar year)  2022     MEDICARE ANNUAL WELLNESS VISIT  2022     LIPID  2022     DIABETIC FOOT EXAM  2022     FALL RISK ASSESSMENT  2022     ZOSTER IMMUNIZATION (2 of 2) 2021     BP Readings from Last 3 Encounters:   22 138/62   21 134/72   21 132/80    Wt Readings from Last 3 Encounters:   22 95.3 kg (210 lb)   21 95.3 kg (210 lb)   21 100.2 kg (221 lb)                  Patient Active Problem List   Diagnosis     BPH (benign prostatic hyperplasia)     CARDIOVASCULAR SCREENING; LDL GOAL LESS THAN 100     Advanced directives, counseling/discussion     Obesity (BMI 30.0-34.9)     Type 2 diabetes mellitus without complications (H)     Normocytic normochromic anemia     Type 2 diabetes mellitus without complication, without long-term current use of insulin (H)     Chronic kidney disease, stage 2 (mild)     Past Surgical History:   Procedure Laterality Date     COLONOSCOPY  2002    Normal     COLONOSCOPY  07    Diverticulosis; repeat in 10 years     EXTRACAPSULAR CATARACT EXTRATION WITH INTRAOCULAR LENS IMPLANT  2010    right eye     EXTRACAPSULAR CATARACT EXTRATION WITH INTRAOCULAR LENS IMPLANT  2010     left eye     HC REPAIR INCISIONAL HERNIA,REDUCIBLE      Hernia Repair, Incisional, Unilateral     HC REPAIR OF NASAL SEPTUM       HC REPAIR ROTATOR CUFF,ACUTE  2008    left shoulder     HC VASECTOMY UNILAT/BILAT W POSTOP SEMEN  1974    Vasectomy     LAPAROSCOPIC HERNIORRHAPHY VENTRAL  2013    Procedure: LAPAROSCOPIC HERNIORRHAPHY VENTRAL;  LAPAROSCOPIC VENTRAL  RIGHT HERNIA REPAIR WITH MESH;  Surgeon: Jeanmarie Hutchison MD;  Location: [facility]     ZC LAMINOTOMY,LUMBAR DISK,1 INTRSP         Social History     Tobacco Use     Smoking status: Former Smoker     Packs/day: 0.50     Years: 1.00     Pack years: 0.50     Types: Cigarettes     Quit date: 1961     Years since quittin.2     Smokeless tobacco: Never Used   Substance Use Topics     Alcohol use: No     Alcohol/week: 0.0 standard drinks     Family History   Problem Relation Age of Onset     Cancer - colorectal Paternal Uncle      C.A.D. Paternal Uncle      Anesthesia Reaction No family hx of      Blood Disease No family hx of      Thyroid Disease No family hx of      Eye Disorder No family hx of      Hypertension No family hx of          Current Outpatient Medications   Medication Sig Dispense Refill     ASPIRIN 81 MG OR TABS 1 tab po QD (Once per day)       Cholecalciferol (VITAMIN D3) 3000 UNIT TABS        hydrOXYzine (VISTARIL) 25 MG capsule Take 1 capsule (25 mg) by mouth 3 times daily as needed for itching 30 capsule 1     IBUPROFEN PO Take by mouth every 4 hours as needed for moderate pain       lisinopril (ZESTRIL) 5 MG tablet TAKE ONE TABLET BY MOUTH ONE TIME DAILY 90 tablet 1     metFORMIN (GLUCOPHAGE) 1000 MG tablet Take 1 tablet (1,000 mg) by mouth 2 times daily (with meals) 180 tablet 1     MULTIVITAMIN TABS   OR 1 DAILY       pravastatin (PRAVACHOL) 20 MG tablet TAKE 0.5 TABLET BY MOUTH DAILY 90 tablet 3     predniSONE (DELTASONE) 20 MG tablet Take 3 tabs by mouth daily x 2 days, then 2 tabs daily x 2 days, then 1 tab  "daily x 2 days, then 1/2 tab daily x 2 days. 13 tablet 0     terazosin (HYTRIN) 5 MG capsule TAKE ONE CAPSULE BY MOUTH ONCE DAILY AT BEDTIME 90 capsule 3     triamcinolone (KENALOG) 0.1 % external cream Apply topically 2 times daily 45 g 2     vitamin B complex with vitamin C (VITAMIN  B COMPLEX) TABS tablet Take 1 tablet by mouth daily        Allergies   Allergen Reactions     No Known Drug Allergies      Recent Labs   Lab Test 11/19/21  1108 01/25/21  1110 09/11/20  1238 10/23/19  1012 03/21/19  1215 10/25/18  1120   A1C 7.0* 7.2* 6.7* 6.6* 6.5* 6.2*   * 110* 115* 93  --  77   HDL  --  50  --  37*  --  37*   TRIG  --  138  --  126  --  129   ALT 41  --  75*  --  38 43   CR 1.00 1.05 1.04 1.06 1.15 1.24   GFRESTIMATED 70 66 67 66 60* 56*   GFRESTBLACK  --  77 78 76 70 68   POTASSIUM 4.1 4.7 4.4 4.3 4.4 4.8   TSH  --   --  2.19 2.99  --   --       Pneumonia Vaccine:Adults age 65+ who received Pneumovax (PPSV23) at 65 years or older: Should be given PCV13 > 1 year after their most recent PPSV23        Review of Systems  Constitutional, HEENT, cardiovascular, pulmonary, gi and gu systems are negative, except as otherwise noted.    OBJECTIVE:   /62 (BP Location: Left arm, Patient Position: Sitting, Cuff Size: Adult Regular)   Pulse 84   Temp 97.7  F (36.5  C) (Temporal)   Resp 16   Ht 1.702 m (5' 7\")   Wt 95.3 kg (210 lb)   SpO2 100%   BMI 32.89 kg/m   Estimated body mass index is 32.89 kg/m  as calculated from the following:    Height as of this encounter: 1.702 m (5' 7\").    Weight as of this encounter: 95.3 kg (210 lb).  Physical Exam  GENERAL: healthy, alert and no distress  EYES: Eyes grossly normal to inspection, PERRL and conjunctivae and sclerae normal  HENT: ear canals and TM's normal, nose and mouth without ulcers or lesions  NECK: no adenopathy, no asymmetry, masses, or scars and thyroid normal to palpation  RESP: lungs clear to auscultation - no rales, rhonchi or wheezes  CV: regular " "rate and rhythm, normal S1 S2, no S3 or S4, no murmur, click or rub, no peripheral edema and peripheral pulses strong  ABDOMEN: soft, nontender, no hepatosplenomegaly, no masses and bowel sounds normal  MS: no gross musculoskeletal defects noted, no edema  SKIN: no suspicious lesions or rashes  NEURO: Normal strength and tone, mentation intact and speech normal        ASSESSMENT / PLAN:       ICD-10-CM    1. Healthcare maintenance  Z00.00 CBC with platelets     Comprehensive metabolic panel (BMP + Alb, Alk Phos, ALT, AST, Total. Bili, TP)     PSA, screen     Hemoglobin A1c     CBC with platelets     Comprehensive metabolic panel (BMP + Alb, Alk Phos, ALT, AST, Total. Bili, TP)     PSA, screen     Hemoglobin A1c   2. Type 2 diabetes mellitus without complication, without long-term current use of insulin (H)  E11.9 pravastatin (PRAVACHOL) 20 MG tablet     FOOT EXAM   3. Mixed hyperlipidemia  E78.2 pravastatin (PRAVACHOL) 20 MG tablet   4. Screening for hyperlipidemia  Z13.220 Lipid panel reflex to direct LDL Fasting     Lipid panel reflex to direct LDL Fasting   5. Screening for prostate cancer  Z12.5 PSA, screen     PSA, screen   6. Chronic kidney disease, stage 2 (mild)  N18.2    7. Benign non-nodular prostatic hyperplasia without lower urinary tract symptoms  N40.0 terazosin (HYTRIN) 5 MG capsule           COUNSELING:  Reviewed preventive health counseling, as reflected in patient instructions    Estimated body mass index is 32.89 kg/m  as calculated from the following:    Height as of 11/19/21: 1.702 m (5' 7\").    Weight as of 11/19/21: 95.3 kg (210 lb).    Weight management plan: Discussed healthy diet and exercise guidelines    He reports that he quit smoking about 61 years ago. His smoking use included cigarettes. He has a 0.50 pack-year smoking history. He has never used smokeless tobacco.      Appropriate preventive services were discussed with this patient, including applicable screening as appropriate for " cardiovascular disease, diabetes, osteopenia/osteoporosis, and glaucoma.  As appropriate for age/gender, discussed screening for colorectal cancer, prostate cancer, breast cancer, and cervical cancer. Checklist reviewing preventive services available has been given to the patient.    Reviewed patients plan of care and provided an AVS. The Basic Care Plan (routine screening as documented in Health Maintenance) for Vikash meets the Care Plan requirement. This Care Plan has been established and reviewed with the Patient.    Counseling Resources:  ATP IV Guidelines  Pooled Cohorts Equation Calculator  Breast Cancer Risk Calculator  Breast Cancer: Medication to Reduce Risk  FRAX Risk Assessment  ICSI Preventive Guidelines  Dietary Guidelines for Americans, 2010  "[employer]'s MyPlate  ASA Prophylaxis  Lung CA Screening    Hamilton Romo MD  [facility]    Identified Health Risks:

## 2022-10-16 ENCOUNTER — HEALTH MAINTENANCE LETTER (OUTPATIENT)
Age: 82
End: 2022-10-16

## 2022-12-12 DIAGNOSIS — E11.9 TYPE 2 DIABETES MELLITUS WITHOUT COMPLICATION, WITHOUT LONG-TERM CURRENT USE OF INSULIN (H): ICD-10-CM

## 2023-04-20 ENCOUNTER — PATIENT OUTREACH (OUTPATIENT)
Dept: CARE COORDINATION | Facility: CLINIC | Age: 83
End: 2023-04-20
Payer: MEDICARE

## 2023-06-01 ENCOUNTER — HEALTH MAINTENANCE LETTER (OUTPATIENT)
Age: 83
End: 2023-06-01

## 2023-10-21 DIAGNOSIS — E11.9 TYPE 2 DIABETES MELLITUS WITHOUT COMPLICATION, WITHOUT LONG-TERM CURRENT USE OF INSULIN (H): ICD-10-CM

## 2023-10-23 NOTE — TELEPHONE ENCOUNTER
Spoke to pt, pt stated he would call back to schedule appointment because he needed to figure out when someone could bring him to the appointment.     Fanta Alvarado RN on 10/23/2023 at 3:05 PM

## 2023-11-06 ENCOUNTER — VIRTUAL VISIT (OUTPATIENT)
Dept: FAMILY MEDICINE | Facility: CLINIC | Age: 83
End: 2023-11-06
Payer: MEDICARE

## 2023-11-06 DIAGNOSIS — N18.2 CHRONIC KIDNEY DISEASE, STAGE 2 (MILD): ICD-10-CM

## 2023-11-06 DIAGNOSIS — N40.0 BENIGN NON-NODULAR PROSTATIC HYPERPLASIA WITHOUT LOWER URINARY TRACT SYMPTOMS: ICD-10-CM

## 2023-11-06 DIAGNOSIS — Z12.5 SCREENING FOR PROSTATE CANCER: ICD-10-CM

## 2023-11-06 DIAGNOSIS — E78.2 MIXED HYPERLIPIDEMIA: ICD-10-CM

## 2023-11-06 DIAGNOSIS — R80.9 MICROALBUMINURIA: ICD-10-CM

## 2023-11-06 DIAGNOSIS — E11.42 DIABETIC PERIPHERAL NEUROPATHY ASSOCIATED WITH TYPE 2 DIABETES MELLITUS (H): ICD-10-CM

## 2023-11-06 DIAGNOSIS — E11.9 TYPE 2 DIABETES MELLITUS WITHOUT COMPLICATION, WITHOUT LONG-TERM CURRENT USE OF INSULIN (H): Primary | ICD-10-CM

## 2023-11-06 PROCEDURE — 99443 PR PHYSICIAN TELEPHONE EVALUATION 21-30 MIN: CPT | Mod: 95 | Performed by: FAMILY MEDICINE

## 2023-11-06 RX ORDER — PRAVASTATIN SODIUM 20 MG
TABLET ORAL
Qty: 90 TABLET | Refills: 3 | Status: SHIPPED | OUTPATIENT
Start: 2023-11-06

## 2023-11-06 RX ORDER — LISINOPRIL 5 MG/1
5 TABLET ORAL DAILY
Qty: 90 TABLET | Refills: 1 | Status: SHIPPED | OUTPATIENT
Start: 2023-11-06

## 2023-11-06 RX ORDER — TERAZOSIN 5 MG/1
CAPSULE ORAL
Qty: 90 CAPSULE | Refills: 3 | Status: SHIPPED | OUTPATIENT
Start: 2023-11-06

## 2023-11-06 NOTE — PROGRESS NOTES
TISH is a 82 year old who is being evaluated via a billable telephone visit.      What phone number would you like to be contacted at? [phone]-  How would you like to obtain your AVS? Mail a copy    Distant Location (provider location):  On-site    Assessment & Plan     Type 2 diabetes mellitus without complication, without long-term current use of insulin (H)  Has been fluctuating, will review the lab and update pt   - CBC with platelets; Future  - Comprehensive metabolic panel (BMP + Alb, Alk Phos, ALT, AST, Total. Bili, TP); Future  - Lipid panel reflex to direct LDL Fasting; Future  - Hemoglobin A1c; Future  - Albumin Random Urine Quantitative with Creat Ratio; Future  - pravastatin (PRAVACHOL) 20 MG tablet; TAKE 0.5 TABLET BY MOUTH DAILY    Mixed hyperlipidemia  Stable  Keep monitoring   - CBC with platelets; Future  - Comprehensive metabolic panel (BMP + Alb, Alk Phos, ALT, AST, Total. Bili, TP); Future  - Lipid panel reflex to direct LDL Fasting; Future  - Hemoglobin A1c; Future  - Albumin Random Urine Quantitative with Creat Ratio; Future  - pravastatin (PRAVACHOL) 20 MG tablet; TAKE 0.5 TABLET BY MOUTH DAILY    Chronic kidney disease, stage 2 (mild)  Stable   - CBC with platelets; Future  - Comprehensive metabolic panel (BMP + Alb, Alk Phos, ALT, AST, Total. Bili, TP); Future  - Lipid panel reflex to direct LDL Fasting; Future  - Hemoglobin A1c; Future  - Albumin Random Urine Quantitative with Creat Ratio; Future    Benign non-nodular prostatic hyperplasia without lower urinary tract symptoms  Will review thel ab results and update pt   - CBC with platelets; Future  - Comprehensive metabolic panel (BMP + Alb, Alk Phos, ALT, AST, Total. Bili, TP); Future  - Lipid panel reflex to direct LDL Fasting; Future  - Hemoglobin A1c; Future  - Albumin Random Urine Quantitative with Creat Ratio; Future  - terazosin (HYTRIN) 5 MG capsule; TAKE ONE CAPSULE BY MOUTH ONCE DAILY AT BEDTIME    Microalbuminuria    -  lisinopril (ZESTRIL) 5 MG tablet; Take 1 tablet (5 mg) by mouth daily    Screening for prostate cancer    - PSA, screen; Future    Diabetic peripheral neuropathy associated with type 2 diabetes mellitus (H)  Has been worsening with fluctuating DM status, will review the lab and update pt  Encouraged him PT, pt will contact us  when gets more interest             FUTURE APPOINTMENTS:       - Follow-up visit in 6 months for DM and AWV    Hamilton Romo MD  [facility] [place] WINST[place] is a 82 year old, presenting for the following health issues:  Recheck Medication      11/6/2023     9:34 AM   Additional Questions   Roomed by Niurka GUO     Diabetes Follow-up    How often are you checking your blood sugar? One time daily  What time of day are you checking your blood sugars (select all that apply)?  Before meals  Have you had any blood sugars above 200?  No  Have you had any blood sugars below 70?  No  What symptoms do you notice when your blood sugar is low?  None  What concerns do you have today about your diabetes? None   Do you have any of these symptoms? (Select all that apply)  Numbness in feet  Have you had a diabetic eye exam in the last 12 months? No            Hyperlipidemia Follow-Up    Are you regularly taking any medication or supplement to lower your cholesterol?   Yes- statin  Are you having muscle aches or other side effects that you think could be caused by your cholesterol lowering medication?  No    Hypertension Follow-up    Do you check your blood pressure regularly outside of the clinic? Yes   Are you following a low salt diet? Yes  Are your blood pressures ever more than 140 on the top number (systolic) OR more   than 90 on the bottom number (diastolic), for example 140/90? Yes    BP Readings from Last 2 Encounters:   03/23/22 138/62   11/19/21 134/72     Hemoglobin A1C (%)   Date Value   03/23/2022 6.9 (H)   11/19/2021 7.0 (H)   01/25/2021 7.2 (H)   09/11/2020  6.7 (H)     LDL Cholesterol Calculated (mg/dL)   Date Value   03/23/2022 82   01/25/2021 110 (H)   10/23/2019 93     LDL Cholesterol Direct (mg/dL)   Date Value   11/19/2021 109 (H)   09/11/2020 115 (H)           Review of Systems   Constitutional, HEENT, cardiovascular, pulmonary, gi and gu systems are negative, except as otherwise noted.      Objective           Vitals:  No vitals were obtained today due to virtual visit.    Physical Exam   healthy, alert, and no distress  PSYCH: Alert and oriented times 3; coherent speech, normal   rate and volume, able to articulate logical thoughts, able   to abstract reason, no tangential thoughts, no hallucinations   or delusions  His affect is normal  RESP: No cough, no audible wheezing, able to talk in full sentences  Remainder of exam unable to be completed due to telephone visits                Phone call duration: 27 minutes

## 2023-11-06 NOTE — PROGRESS NOTES
"TISH is a 82 year old who is being evaluated via a billable video visit.      How would you like to obtain your AVS? {AVS Preference:992254}  If the video visit is dropped, the invitation should be resent by: {video visit invitation (Optional) :966051}  Will anyone else be joining your video visit? {:888208}  {If patient encounters technical issues they should call [phone] :968562}        {PROVIDER CHARTING PREFERENCE:881516}    Amanda WINSTON is a 82 year old, presenting for the following health issues:  Recheck Medication      11/6/2023     9:34 AM   Additional Questions   Roomed by Niurka BAUTISTA       History of Present Illness       Reason for visit:  Medication check and refill    He eats 2-3 servings of fruits and vegetables daily.He consumes 1 sweetened beverage(s) daily.He exercises with enough effort to increase his heart rate 9 or less minutes per day.  He exercises with enough effort to increase his heart rate 3 or less days per week.   He is taking medications regularly.       {SUPERLIST (Optional):443113}  {additonal problems for provider to add (Optional):479302}      Review of Systems   {ROS COMP (Optional):431490}      Objective           Vitals:  No vitals were obtained today due to virtual visit.    Physical Exam   {video visit exam brief selected:855878}    {Diagnostic Test Results (Optional):819574}    {AMBULATORY ATTESTATION (Optional):114254}        Video-Visit Details    Type of service:  Video Visit   Video Start Time: {video visit start/end time for provider to select:037810}  Video End Time:{video visit start/end time for provider to select:001043}    Originating Location (pt. Location): {video visit patient location:891762::\"Home\"}  {PROVIDER LOCATION On-site should be selected for visits conducted from your clinic location or adjoining [facility] hospital, academic office, or other nearby [facility] building. Off-site should be selected for all other provider locations, including " "home:112725}  Distant Location (provider location):  {virtual location provider:745163}  Platform used for Video Visit: {Virtual Visit Platforms:153313::\"[employer]\"}      "

## 2023-11-16 ENCOUNTER — LAB (OUTPATIENT)
Dept: LAB | Facility: CLINIC | Age: 83
End: 2023-11-16
Payer: MEDICARE

## 2023-11-16 DIAGNOSIS — E78.2 MIXED HYPERLIPIDEMIA: ICD-10-CM

## 2023-11-16 DIAGNOSIS — N18.2 CHRONIC KIDNEY DISEASE, STAGE 2 (MILD): ICD-10-CM

## 2023-11-16 DIAGNOSIS — Z12.5 SCREENING FOR PROSTATE CANCER: ICD-10-CM

## 2023-11-16 DIAGNOSIS — E11.9 TYPE 2 DIABETES MELLITUS WITHOUT COMPLICATION, WITHOUT LONG-TERM CURRENT USE OF INSULIN (H): ICD-10-CM

## 2023-11-16 DIAGNOSIS — N40.0 BENIGN NON-NODULAR PROSTATIC HYPERPLASIA WITHOUT LOWER URINARY TRACT SYMPTOMS: ICD-10-CM

## 2023-11-16 LAB
ALBUMIN SERPL BCG-MCNC: 4.2 G/DL (ref 3.5–5.2)
ALP SERPL-CCNC: 117 U/L (ref 40–150)
ALT SERPL W P-5'-P-CCNC: 23 U/L (ref 0–70)
ANION GAP SERPL CALCULATED.3IONS-SCNC: 12 MMOL/L (ref 7–15)
AST SERPL W P-5'-P-CCNC: 28 U/L (ref 0–45)
BILIRUB SERPL-MCNC: 0.3 MG/DL
BUN SERPL-MCNC: 25.4 MG/DL (ref 8–23)
CALCIUM SERPL-MCNC: 9.5 MG/DL (ref 8.8–10.2)
CHLORIDE SERPL-SCNC: 103 MMOL/L (ref 98–107)
CHOLEST SERPL-MCNC: 160 MG/DL
CREAT SERPL-MCNC: 1.09 MG/DL (ref 0.67–1.17)
CREAT UR-MCNC: 84.9 MG/DL
DEPRECATED HCO3 PLAS-SCNC: 22 MMOL/L (ref 22–29)
EGFRCR SERPLBLD CKD-EPI 2021: 67 ML/MIN/1.73M2
ERYTHROCYTE [DISTWIDTH] IN BLOOD BY AUTOMATED COUNT: 12.8 % (ref 10–15)
GLUCOSE SERPL-MCNC: 105 MG/DL (ref 70–99)
HBA1C MFR BLD: 6.4 % (ref 0–5.6)
HCT VFR BLD AUTO: 41 % (ref 40–53)
HDLC SERPL-MCNC: 37 MG/DL
HGB BLD-MCNC: 13.4 G/DL (ref 13.3–17.7)
LDLC SERPL CALC-MCNC: 95 MG/DL
MCH RBC QN AUTO: 28.9 PG (ref 26.5–33)
MCHC RBC AUTO-ENTMCNC: 32.7 G/DL (ref 31.5–36.5)
MCV RBC AUTO: 88 FL (ref 78–100)
MICROALBUMIN UR-MCNC: 14.6 MG/L
MICROALBUMIN/CREAT UR: 17.2 MG/G CR (ref 0–17)
NONHDLC SERPL-MCNC: 123 MG/DL
PLATELET # BLD AUTO: 211 10E3/UL (ref 150–450)
POTASSIUM SERPL-SCNC: 4.7 MMOL/L (ref 3.4–5.3)
PROT SERPL-MCNC: 7.2 G/DL (ref 6.4–8.3)
PSA SERPL DL<=0.01 NG/ML-MCNC: 1.39 NG/ML
RBC # BLD AUTO: 4.64 10E6/UL (ref 4.4–5.9)
SODIUM SERPL-SCNC: 137 MMOL/L (ref 135–145)
TRIGL SERPL-MCNC: 138 MG/DL
WBC # BLD AUTO: 7.3 10E3/UL (ref 4–11)

## 2023-11-16 PROCEDURE — 85027 COMPLETE CBC AUTOMATED: CPT

## 2023-11-16 PROCEDURE — G0103 PSA SCREENING: HCPCS

## 2023-11-16 PROCEDURE — 83036 HEMOGLOBIN GLYCOSYLATED A1C: CPT

## 2023-11-16 PROCEDURE — 80061 LIPID PANEL: CPT

## 2023-11-16 PROCEDURE — 82043 UR ALBUMIN QUANTITATIVE: CPT

## 2023-11-16 PROCEDURE — 80053 COMPREHEN METABOLIC PANEL: CPT

## 2023-11-16 PROCEDURE — 36415 COLL VENOUS BLD VENIPUNCTURE: CPT

## 2023-11-16 PROCEDURE — 82570 ASSAY OF URINE CREATININE: CPT

## 2024-06-01 ENCOUNTER — HEALTH MAINTENANCE LETTER (OUTPATIENT)
Age: 84
End: 2024-06-01

## 2024-06-08 DIAGNOSIS — E11.9 TYPE 2 DIABETES MELLITUS WITHOUT COMPLICATION, WITHOUT LONG-TERM CURRENT USE OF INSULIN (H): ICD-10-CM

## 2024-06-10 DIAGNOSIS — E11.9 TYPE 2 DIABETES MELLITUS WITHOUT COMPLICATION, WITHOUT LONG-TERM CURRENT USE OF INSULIN (H): ICD-10-CM

## 2024-06-15 DIAGNOSIS — E11.9 TYPE 2 DIABETES MELLITUS WITHOUT COMPLICATION, WITHOUT LONG-TERM CURRENT USE OF INSULIN (H): ICD-10-CM

## 2024-08-10 ENCOUNTER — HEALTH MAINTENANCE LETTER (OUTPATIENT)
Age: 84
End: 2024-08-10

## 2024-08-16 ENCOUNTER — TELEPHONE (OUTPATIENT)
Dept: FAMILY MEDICINE | Facility: CLINIC | Age: 84
End: 2024-08-16
Payer: MEDICARE

## 2024-08-16 DIAGNOSIS — E11.9 TYPE 2 DIABETES MELLITUS WITHOUT COMPLICATION, WITHOUT LONG-TERM CURRENT USE OF INSULIN (H): ICD-10-CM

## 2024-08-16 NOTE — LETTER
August 22, 2024      Vikash Arredondo   [address]   [place] [address]      Carlos A Thakkar,    Our records indicate that it is time to schedule a visit with your primary care provider.  You are due to be seen for  your annual physical, medication check and fasting lab.  We have sent to the pharmacy a  2 months  refill of your medication until you can be seen by your provider.  You may call [phone] to schedule or via GetMaid using the appointment tab.  Schedules fill quickly, so we recommend scheduling at this time.     If you are no longer a [facility] patient; please contact us and let us know that as well.  You will need to let the pharmacy know the name of your new provider so that they can send future refill requests to them.    Thank you       [facility] [place]

## 2024-08-16 NOTE — TELEPHONE ENCOUNTER
Due for med check/CPE and fasting lab, 2 month supplies sent, please help him to schedule       thx     [phone]

## 2024-12-22 ENCOUNTER — HEALTH MAINTENANCE LETTER (OUTPATIENT)
Age: 84
End: 2024-12-22

## 2025-02-13 ENCOUNTER — OFFICE VISIT (OUTPATIENT)
Dept: FAMILY MEDICINE | Facility: CLINIC | Age: 85
End: 2025-02-13
Payer: COMMERCIAL

## 2025-02-13 VITALS
SYSTOLIC BLOOD PRESSURE: 145 MMHG | HEART RATE: 90 BPM | DIASTOLIC BLOOD PRESSURE: 81 MMHG | WEIGHT: 213 LBS | HEIGHT: 66 IN | RESPIRATION RATE: 18 BRPM | BODY MASS INDEX: 34.23 KG/M2 | TEMPERATURE: 96.9 F | OXYGEN SATURATION: 98 %

## 2025-02-13 DIAGNOSIS — N18.2 CHRONIC KIDNEY DISEASE, STAGE 2 (MILD): ICD-10-CM

## 2025-02-13 DIAGNOSIS — E11.9 TYPE 2 DIABETES MELLITUS WITHOUT COMPLICATION, WITHOUT LONG-TERM CURRENT USE OF INSULIN (H): ICD-10-CM

## 2025-02-13 DIAGNOSIS — I10 BENIGN ESSENTIAL HYPERTENSION: ICD-10-CM

## 2025-02-13 DIAGNOSIS — E11.42 DIABETIC PERIPHERAL NEUROPATHY ASSOCIATED WITH TYPE 2 DIABETES MELLITUS (H): ICD-10-CM

## 2025-02-13 DIAGNOSIS — Z00.01 ENCOUNTER FOR GENERAL ADULT MEDICAL EXAMINATION WITH ABNORMAL FINDINGS: Primary | ICD-10-CM

## 2025-02-13 DIAGNOSIS — Z12.5 SCREENING FOR PROSTATE CANCER: ICD-10-CM

## 2025-02-13 RX ORDER — LISINOPRIL 10 MG/1
10 TABLET ORAL DAILY
Qty: 90 TABLET | Refills: 3 | Status: SHIPPED | OUTPATIENT
Start: 2025-02-13

## 2025-02-13 SDOH — HEALTH STABILITY: PHYSICAL HEALTH: ON AVERAGE, HOW MANY DAYS PER WEEK DO YOU ENGAGE IN MODERATE TO STRENUOUS EXERCISE (LIKE A BRISK WALK)?: 0 DAYS

## 2025-02-13 SDOH — HEALTH STABILITY: PHYSICAL HEALTH: ON AVERAGE, HOW MANY MINUTES DO YOU ENGAGE IN EXERCISE AT THIS LEVEL?: 0 MIN

## 2025-02-13 ASSESSMENT — SOCIAL DETERMINANTS OF HEALTH (SDOH): HOW OFTEN DO YOU GET TOGETHER WITH FRIENDS OR RELATIVES?: ONCE A WEEK

## 2025-02-13 ASSESSMENT — PAIN SCALES - GENERAL: PAINLEVEL_OUTOF10: NO PAIN (0)

## 2025-02-13 NOTE — PROGRESS NOTES
"Preventive Care Visit  [facility] LEWIS Rmoo MD, Family Medicine  Feb 13, 2025      Assessment & Plan     Type 2 diabetes mellitus without complication, without long-term current use of insulin (H)  Stable   Keep monitoring   Will review the lab and update pt  - HEMOGLOBIN A1C; Future  - Lipid panel reflex to direct LDL Non-fasting; Future  - FOOT EXAM    Chronic kidney disease, stage 2 (mild)    - Albumin Random Urine Quantitative with Creat Ratio; Future  - UA Macroscopic with reflex to Microscopic and Culture; Future    Encounter for general adult medical examination with abnormal findings    - HEMOGLOBIN A1C; Future  - Lipid panel reflex to direct LDL Non-fasting; Future  - Albumin Random Urine Quantitative with Creat Ratio; Future  - UA Macroscopic with reflex to Microscopic and Culture; Future  - CBC with platelets; Future  - Comprehensive metabolic panel (BMP + Alb, Alk Phos, ALT, AST, Total. Bili, TP); Future  - PSA, screen; Future    Screening for prostate cancer    - PSA, screen; Future    Diabetic peripheral neuropathy associated with type 2 diabetes mellitus (H)  Has been gradually worsening with poor balance, suggested seeing PT for balance treatment, pt declined due to financial reason, will keep monitoring closely     Benign essential hypertension  Has been worsening without clinical sx, will have him to increase the dose of lisinopril to 10mg and recheck in 6 months   - lisinopril (ZESTRIL) 10 MG tablet; Take 1 tablet (10 mg) by mouth daily.    Patient has been advised of split billing requirements and indicates understanding: Yes        BMI  Estimated body mass index is 34.85 kg/m  as calculated from the following:    Height as of this encounter: 1.665 m (5' 5.55\").    Weight as of this encounter: 96.6 kg (213 lb).   Weight management plan: Discussed healthy diet and exercise guidelines    Counseling  Appropriate preventive services were addressed with this patient via " screening, questionnaire, or discussion as appropriate for fall prevention, nutrition, physical activity, Tobacco-use cessation, social engagement, weight loss and cognition.  Checklist reviewing preventive services available has been given to the patient.  Reviewed patient's diet, addressing concerns and/or questions.   The patient was instructed to see the dentist every 6 months.   The patient was provided with written information regarding signs of hearing loss.       FUTURE APPOINTMENTS:       - Follow-up visit in 1 year     Amanda WINSTON is a 84 year old, presenting for the following:  Physical (Non fasting. )        2/13/2025    12:58 PM   Additional Questions   Roomed by Rossy GUO      Diabetes Follow-up    How often are you checking your blood sugar? Not at all  What concerns do you have today about your diabetes? None   Do you have any of these symptoms? (Select all that apply)  Numbness in feet  Have you had a diabetic eye exam in the last 12 months? No            Hyperlipidemia Follow-Up    Are you regularly taking any medication or supplement to lower your cholesterol?   Yes- statin  Are you having muscle aches or other side effects that you think could be caused by your cholesterol lowering medication?  No    Hypertension Follow-up    Do you check your blood pressure regularly outside of the clinic? Yes   Are you following a low salt diet? Yes  Are your blood pressures ever more than 140 on the top number (systolic) OR more   than 90 on the bottom number (diastolic), for example 140/90? Yes    BP Readings from Last 2 Encounters:   02/13/25 (!) 145/81   03/23/22 138/62     Hemoglobin A1C (%)   Date Value   11/16/2023 6.4 (H)   03/23/2022 6.9 (H)   01/25/2021 7.2 (H)   09/11/2020 6.7 (H)     LDL Cholesterol Calculated (mg/dL)   Date Value   11/16/2023 95   03/23/2022 82   01/25/2021 110 (H)   10/23/2019 93     LDL Cholesterol Direct (mg/dL)   Date Value   11/19/2021 109 (H)   09/11/2020  115 (H)       Health Care Directive  Patient does not have a Health Care Directive: Discussed advance care planning with patient; however, patient declined at this time.      2/13/2025   General Health   How would you rate your overall physical health? (!) FAIR   Feel stress (tense, anxious, or unable to sleep) Not at all         2/13/2025   Nutrition   Diet: Regular (no restrictions)    Low salt    Low fat/cholesterol       Multiple values from one day are sorted in reverse-chronological order         2/13/2025   Exercise   Days per week of moderate/strenous exercise 0 days   Average minutes spent exercising at this level 0 min   (!) EXERCISE CONCERN      2/13/2025   Social Factors   Frequency of gathering with friends or relatives Once a week   Worry food won't last until get money to buy more No   Food not last or not have enough money for food? No   Do you have housing? (Housing is defined as stable permanent housing and does not include staying ouside in a car, in a tent, in an abandoned building, in an overnight shelter, or couch-surfing.) Yes   Are you worried about losing your housing? No   Lack of transportation? No   Unable to get utilities (heat,electricity)? No         2/13/2025   Fall Risk   Fallen 2 or more times in the past year? No    Trouble with walking or balance? No        Proxy-reported          2/13/2025   Activities of Daily Living- Home Safety   Needs help with the following daily activites None of the above   Safety concerns in the home None of the above         2/13/2025   Dental   Dentist two times every year? (!) NO         2/13/2025   Hearing Screening   Hearing concerns? (!) I FEEL THAT PEOPLE ARE MUMBLING OR NOT SPEAKING CLEARLY.    (!) I NEED TO ASK PEOPLE TO SPEAK UP OR REPEAT THEMSELVES.       Multiple values from one day are sorted in reverse-chronological order         2/13/2025   Driving Risk Screening   Patient/family members have concerns about driving No         2/13/2025    General Alertness/Fatigue Screening   Have you been more tired than usual lately? No         2025   Urinary Incontinence Screening   Bothered by leaking urine in past 6 months No            Today's PHQ-2 Score:       2025     1:03 PM   PHQ-2 (  [employer])   PHQ-2 Score Incomplete           2025   Substance Use   Alcohol more than 3/day or more than 7/wk No   Do you have a current opioid prescription? No   How severe/bad is pain from 1 to 10? 0/10 (No Pain)   Do you use any other substances recreationally? No     Social History     Tobacco Use    Smoking status: Former     Current packs/day: 0.00     Average packs/day: 0.5 packs/day for 1 year (0.5 ttl pk-yrs)     Types: Cigarettes     Start date: 1960     Quit date: 1961     Years since quittin.1    Smokeless tobacco: Never   Vaping Use    Vaping status: Never Used   Substance Use Topics    Alcohol use: No     Alcohol/week: 0.0 standard drinks of alcohol    Drug use: No                 Reviewed and updated as needed this visit by Provider                    Past Medical History:   Diagnosis Date    Cataract 2010    Both eyes    type 2 diabetes     Type 2 diabetes mellitus without complication, without long-term current use of insulin (H) 10/22/2018     Past Surgical History:   Procedure Laterality Date    COLONOSCOPY  2002    Normal    COLONOSCOPY  07    Diverticulosis; repeat in 10 years    EXTRACAPSULAR CATARACT EXTRATION WITH INTRAOCULAR LENS IMPLANT  2010    right eye    EXTRACAPSULAR CATARACT EXTRATION WITH INTRAOCULAR LENS IMPLANT  2010    left eye    HC REPAIR OF NASAL SEPTUM      HC REPAIR ROTATOR CUFF,ACUTE  2008    left shoulder    HC VASECTOMY UNILAT/BILAT W POSTOP SEMEN  1974    Vasectomy    LAPAROSCOPIC HERNIORRHAPHY VENTRAL  2013    Procedure: LAPAROSCOPIC HERNIORRHAPHY VENTRAL;  LAPAROSCOPIC VENTRAL  RIGHT HERNIA REPAIR WITH MESH;  Surgeon: Jeanmarie Hutchison MD;  Location: [facility]  LAMINOTOMY,LUMBAR DISK,1 INTRSP      [facility] INCISIONAL [facility]ERN[facility],REDUC[facility]      Hernia Repair, Incisional, Unilateral     Labs reviewed in EPIC  BP Readings from Last 3 Encounters:   25 (!) 145/81   22 138/62   21 134/72    Wt Readings from Last 3 Encounters:   25 96.6 kg (213 lb)   22 95.3 kg (210 lb)   21 95.3 kg (210 lb)                  Patient Active Problem List   Diagnosis    BPH (benign prostatic hyperplasia)    CARDIOVASCULAR SCREENING; LDL GOAL LESS THAN 100    Obesity (BMI 30.0-34.9)    Type 2 diabetes mellitus without complications (H)    Normocytic normochromic anemia    Type 2 diabetes mellitus without complication, without long-term current use of insulin (H)    Chronic kidney disease, stage 2 (mild)    Diabetic peripheral neuropathy associated with type 2 diabetes mellitus (H)     Past Surgical History:   Procedure Laterality Date    COLONOSCOPY  2002    Normal    COLONOSCOPY  07    Diverticulosis; repeat in 10 years    EXTRACAPSULAR CATARACT EXTRATION WITH INTRAOCULAR LENS IMPLANT  2010    right eye    EXTRACAPSULAR CATARACT EXTRATION WITH INTRAOCULAR LENS IMPLANT  2010    left eye    HC REPAIR OF NASAL SEPTUM      HC REPAIR ROTATOR CUFF,ACUTE  2008    left shoulder    HC VASECTOMY UNILAT/BILAT W POSTOP SEMEN  1974    Vasectomy    LAPAROSCOPIC HERNIORRHAPHY VENTRAL  2013    Procedure: LAPAROSCOPIC HERNIORRHAPHY VENTRAL;  LAPAROSCOPIC VENTRAL  RIGHT HERNIA REPAIR WITH MESH;  Surgeon: Jeanmarie Hutchison MD;  Location: [facility] LAMINOTOMY,LUMBAR DISK,1 INTRSP      [facility] REPAIR INCISIONAL HERNIA,REDUCIBLE      Hernia Repair, Incisional, Unilateral       Social History     Tobacco Use    Smoking status: Former     Current packs/day: 0.00     Average packs/day: 0.5 packs/day for 1 year (0.5 ttl pk-yrs)     Types: Cigarettes     Start date: 1960     Quit date: 1961     Years since quittin.1    Smokeless  tobacco: Never   Substance Use Topics    Alcohol use: No     Alcohol/week: 0.0 standard drinks of alcohol     Family History   Problem Relation Age of Onset    Cancer - colorectal Paternal Uncle     C.A.D. Paternal Uncle     Anesthesia Reaction No family hx of     Blood Disease No family hx of     Thyroid Disease No family hx of     Eye Disorder No family hx of     Hypertension No family hx of          Current Outpatient Medications   Medication Sig Dispense Refill    lisinopril (ZESTRIL) 10 MG tablet Take 1 tablet (10 mg) by mouth daily. 90 tablet 3    lisinopril (ZESTRIL) 5 MG tablet Take 1 tablet (5 mg) by mouth daily 90 tablet 1    metFORMIN (GLUCOPHAGE) 1000 MG tablet TAKE 1 TABLET BY MOUTH TWICE DAILY WITH MEALS 60 tablet 1    pravastatin (PRAVACHOL) 20 MG tablet TAKE 0.5 TABLET BY MOUTH DAILY 90 tablet 3     Allergies   Allergen Reactions    No Known Drug Allergy      Recent Labs   Lab Test 11/16/23  1048 03/23/22  1011 11/19/21  1108 11/19/21  1108 01/25/21  1110 09/11/20  1238 10/23/19  1012   A1C 6.4* 6.9*  --  7.0* 7.2* 6.7* 6.6*   LDL 95 82  --  109* 110* 115* 93   HDL 37* 34*  --   --  50  --  37*   TRIG 138 80  --   --  138  --  126   ALT 23 34  --  41  --  75*  --    CR 1.09 1.14   < > 1.00 1.05 1.04 1.06   GFRESTIMATED 67 64   < > 70 66 67 66   GFRESTBLACK  --   --   --   --  77 78 76   POTASSIUM 4.7 4.4   < > 4.1 4.7 4.4 4.3   TSH  --   --   --   --   --  2.19 2.99    < > = values in this interval not displayed.      Current providers sharing in care for this patient include:  Patient Care Team:  Hamilton Romo MD as PCP - General (Family Practice)  Hamilton Romo MD as Assigned PCP    The following health maintenance items are reviewed in Epic and correct as of today:  Health Maintenance   Topic Date Due    URINALYSIS  Never done    DTAP/TDAP/TD IMMUNIZATION (1 - Tdap) 09/12/2012    RSV VACCINE (1 - 1-dose 75+ series) Never done    ZOSTER IMMUNIZATION (2 of 2) 09/08/2021    EYE EXAM  11/18/2022     "ANNUAL REVIEW OF HM ORDERS  11/19/2022    MEDICARE ANNUAL WELLNESS VISIT  03/23/2023    DIABETIC FOOT EXAM  03/23/2023    A1C  05/16/2024    BMP  11/16/2024    LIPID  11/16/2024    MICROALBUMIN  11/16/2024    PHQ-2 (once per calendar year)  01/01/2025    COVID-19 Vaccine (9 - 2024-25 season) 03/21/2025    FALL RISK ASSESSMENT  02/13/2026    COLORECTAL CANCER SCREENING  05/19/2026    ADVANCE CARE PLANNING  03/23/2027    INFLUENZA VACCINE  Completed    Pneumococcal Vaccine: 50+ Years  Completed    HPV IMMUNIZATION  Aged Out    MENINGITIS IMMUNIZATION  Aged Out    TSH W/FREE T4 REFLEX  Discontinued         Review of Systems  Constitutional, HEENT, cardiovascular, pulmonary, GI, , musculoskeletal, neuro, skin, endocrine and psych systems are negative, except as otherwise noted.     Objective    Exam  BP (!) 145/81   Pulse 90   Temp 96.9  F (36.1  C) (Temporal)   Resp 18   Ht 1.665 m (5' 5.55\")   Wt 96.6 kg (213 lb)   SpO2 98%   BMI 34.85 kg/m     Estimated body mass index is 34.85 kg/m  as calculated from the following:    Height as of this encounter: 1.665 m (5' 5.55\").    Weight as of this encounter: 96.6 kg (213 lb).    Physical Exam  GENERAL: alert and no distress  EYES: Eyes grossly normal to inspection, PERRL and conjunctivae and sclerae normal  HENT: ear canals and TM's normal, nose and mouth without ulcers or lesions  NECK: no adenopathy, no asymmetry, masses, or scars  RESP: lungs clear to auscultation - no rales, rhonchi or wheezes  CV: regular rate and rhythm, normal S1 S2, no S3 or S4, no murmur, click or rub, no peripheral edema  ABDOMEN: soft, nontender, no hepatosplenomegaly, no masses and bowel sounds normal  MS: no gross musculoskeletal defects noted, no edema  SKIN: no suspicious lesions or rashes  NEURO: Normal strength and tone, mentation intact and speech normal  BACK: no CVA tenderness, no paralumbar tenderness  LYMPH: no cervical, supraclavicular, axillary, or inguinal " adenopathy  Diabetic foot exam: normal DP and PT pulses, no trophic changes or ulcerative lesions, and normal sensory exam        2/13/2025   Mini Cog   Clock Draw Score 2 Normal   3 Item Recall 0 objects recalled   Mini Cog Total Score 2              Signed Electronically by: Hamilton Romo MD

## 2025-02-13 NOTE — PATIENT INSTRUCTIONS
Patient Education   Preventive Care Advice   This is general advice given by our system to help you stay healthy. However, your care team may have specific advice just for you. Please talk to your care team about your preventive care needs.  Nutrition  Eat 5 or more servings of fruits and vegetables each day.  Try wheat bread, brown rice and whole grain pasta (instead of white bread, rice, and pasta).  Get enough calcium and vitamin D. Check the label on foods and aim for 100% of the RDA (recommended daily allowance).  Lifestyle  Exercise at least 150 minutes each week  (30 minutes a day, 5 days a week).  Do muscle strengthening activities 2 days a week. These help control your weight and prevent disease.  No smoking.  Wear sunscreen to prevent skin cancer.  Have a dental exam and cleaning every 6 months.  Yearly exams  See your health care team every year to talk about:  Any changes in your health.  Any medicines your care team has prescribed.  Preventive care, family planning, and ways to prevent chronic diseases.  Shots (vaccines)   HPV shots (up to age 26), if you've never had them before.  Hepatitis B shots (up to age 59), if you've never had them before.  COVID-19 shot: Get this shot when it's due.  Flu shot: Get a flu shot every year.  Tetanus shot: Get a tetanus shot every 10 years.  Pneumococcal, hepatitis A, and RSV shots: Ask your care team if you need these based on your risk.  Shingles shot (for age 50 and up)  General health tests  Diabetes screening:  Starting at age 35, Get screened for diabetes at least every 3 years.  If you are younger than age 35, ask your care team if you should be screened for diabetes.  Cholesterol test: At age 39, start having a cholesterol test every 5 years, or more often if advised.  Bone density scan (DEXA): At age 50, ask your care team if you should have this scan for osteoporosis (brittle bones).  Hepatitis C: Get tested at least once in your life.  STIs (sexually  transmitted infections)  Before age 24: Ask your care team if you should be screened for STIs.  After age 24: Get screened for STIs if you're at risk. You are at risk for STIs (including HIV) if:  You are sexually active with more than one person.  You don't use condoms every time.  You or a partner was diagnosed with a sexually transmitted infection.  If you are at risk for HIV, ask about PrEP medicine to prevent HIV.  Get tested for HIV at least once in your life, whether you are at risk for HIV or not.  Cancer screening tests  Cervical cancer screening: If you have a cervix, begin getting regular cervical cancer screening tests starting at age 21.  Breast cancer scan (mammogram): If you've ever had breasts, begin having regular mammograms starting at age 40. This is a scan to check for breast cancer.  Colon cancer screening: It is important to start screening for colon cancer at age 45.  Have a colonoscopy test every 10 years (or more often if you're at risk) Or, ask your provider about stool tests like a FIT test every year or Cologuard test every 3 years.  To learn more about your testing options, visit:   .  For help making a decision, visit:   https://bit.ly/db34370.  Prostate cancer screening test: If you have a prostate, ask your care team if a prostate cancer screening test (PSA) at age 55 is right for you.  Lung cancer screening: If you are a current or former smoker ages 50 to 80, ask your care team if ongoing lung cancer screenings are right for you.  For informational purposes only. Not to replace the advice of your health care provider. Copyright   2023 [facility]. All rights reserved. Clinically reviewed by the [facility] Transitions Program. [employer] 111952 - REV 01/24.  Hearing Loss: Care Instructions  Overview     Hearing loss is a sudden or slow decrease in how well you hear. It can range from slight to profound. Permanent hearing loss can occur with aging. It also can  happen when you are exposed long-term to loud noise. Examples include listening to loud music, riding motorcycles, or being around other loud machines.  Hearing loss can affect your work and home life. It can make you feel lonely or depressed. You may feel that you have lost your independence. But hearing aids and other devices can help you hear better and feel connected to others.  Follow-up care is a key part of your treatment and safety. Be sure to make and go to all appointments, and call your doctor if you are having problems. It's also a good idea to know your test results and keep a list of the medicines you take.  How can you care for yourself at home?  Avoid loud noises whenever possible. This helps keep your hearing from getting worse.  Always wear hearing protection around loud noises.  Wear a hearing aid as directed.  A professional can help you pick a hearing aid that will work best for you.  You can also get hearing aids over the counter for mild to moderate hearing loss.  Have hearing tests as your doctor suggests. They can show whether your hearing has changed. Your hearing aid may need to be adjusted.  Use other devices as needed. These may include:  Telephone amplifiers and hearing aids that can connect to a television, stereo, radio, or microphone.  Devices that use lights or vibrations. These alert you to the doorbell, a ringing telephone, or a baby monitor.  Television closed-captioning. This shows the words at the bottom of the screen. Most new TVs can do this.  TTY (text telephone). This lets you type messages back and forth on the telephone instead of talking or listening. These devices are also called TDD. When messages are typed on the keyboard, they are sent over the phone line to a receiving TTY. The message is shown on a monitor.  Use text messaging, social media, and email if it is hard for you to communicate by telephone.  Try to learn a listening technique called speechreading. It is  "not lipreading. You pay attention to people's gestures, expressions, posture, and tone of voice. These clues can help you understand what a person is saying. Face the person you are talking to, and have them face you. Make sure the lighting is good. You need to see the other person's face clearly.  Think about counseling if you need help to adjust to your hearing loss.  When should you call for help?  Watch closely for changes in your health, and be sure to contact your doctor if:    You think your hearing is getting worse.     You have new symptoms, such as dizziness or nausea.   Where can you learn more?  Go to https://www.[employer].net/patiented  Enter R798 in the search box to learn more about \"Hearing Loss: Care Instructions.\"  Current as of: September 27, 2023  Content Version: 14.3    2024 [employer].   Care instructions adapted under license by your healthcare professional. If you have questions about a medical condition or this instruction, always ask your healthcare professional. [employer] disclaims any warranty or liability for your use of this information.       "

## 2025-02-21 DIAGNOSIS — E11.9 TYPE 2 DIABETES MELLITUS WITHOUT COMPLICATION, WITHOUT LONG-TERM CURRENT USE OF INSULIN (H): ICD-10-CM

## 2025-02-21 NOTE — TELEPHONE ENCOUNTER
Clinic RN: Please investigate patient's chart or contact patient if the information cannot be found because patient should have run out of this medication on 10/16/2024. Confirm patient is taking this medication as prescribed. Document findings and route refill encounter to provider for approval or denial.  Della Mello RN, BSN  [facility]

## 2025-02-24 NOTE — TELEPHONE ENCOUNTER
Called patient and he stated that he has missed some doses of his medication. He stated that he tries to take if regularly. Routing to provider to review and advise.     Farzana CARRENO RN  [facility] Triage Team

## 2025-03-12 ENCOUNTER — LAB (OUTPATIENT)
Dept: LAB | Facility: CLINIC | Age: 85
End: 2025-03-12
Payer: COMMERCIAL

## 2025-03-12 DIAGNOSIS — N18.2 CHRONIC KIDNEY DISEASE, STAGE 2 (MILD): ICD-10-CM

## 2025-03-12 DIAGNOSIS — E11.65 POORLY CONTROLLED TYPE 2 DIABETES MELLITUS (H): Primary | ICD-10-CM

## 2025-03-12 DIAGNOSIS — Z00.01 ENCOUNTER FOR GENERAL ADULT MEDICAL EXAMINATION WITH ABNORMAL FINDINGS: ICD-10-CM

## 2025-03-12 DIAGNOSIS — Z12.5 SCREENING FOR PROSTATE CANCER: ICD-10-CM

## 2025-03-12 DIAGNOSIS — E11.9 TYPE 2 DIABETES MELLITUS WITHOUT COMPLICATION, WITHOUT LONG-TERM CURRENT USE OF INSULIN (H): ICD-10-CM

## 2025-03-12 LAB
ALBUMIN UR-MCNC: ABNORMAL MG/DL
APPEARANCE UR: CLEAR
BILIRUB UR QL STRIP: NEGATIVE
COLOR UR AUTO: YELLOW
ERYTHROCYTE [DISTWIDTH] IN BLOOD BY AUTOMATED COUNT: 13 % (ref 10–15)
EST. AVERAGE GLUCOSE BLD GHB EST-MCNC: 180 MG/DL
GLUCOSE UR STRIP-MCNC: NEGATIVE MG/DL
HBA1C MFR BLD: 7.9 % (ref 0–5.6)
HCT VFR BLD AUTO: 41.2 % (ref 40–53)
HGB BLD-MCNC: 14 G/DL (ref 13.3–17.7)
HGB UR QL STRIP: NEGATIVE
KETONES UR STRIP-MCNC: NEGATIVE MG/DL
LEUKOCYTE ESTERASE UR QL STRIP: NEGATIVE
MCH RBC QN AUTO: 29.3 PG (ref 26.5–33)
MCHC RBC AUTO-ENTMCNC: 34 G/DL (ref 31.5–36.5)
MCV RBC AUTO: 86 FL (ref 78–100)
MUCOUS THREADS #/AREA URNS LPF: PRESENT /LPF
NITRATE UR QL: NEGATIVE
PH UR STRIP: 5.5 [PH] (ref 5–7)
PLATELET # BLD AUTO: 236 10E3/UL (ref 150–450)
RBC # BLD AUTO: 4.78 10E6/UL (ref 4.4–5.9)
RBC #/AREA URNS AUTO: ABNORMAL /HPF
SP GR UR STRIP: 1.02 (ref 1–1.03)
UROBILINOGEN UR STRIP-ACNC: 0.2 E.U./DL
WBC # BLD AUTO: 9 10E3/UL (ref 4–11)
WBC #/AREA URNS AUTO: ABNORMAL /HPF

## 2025-03-12 PROCEDURE — 82043 UR ALBUMIN QUANTITATIVE: CPT

## 2025-03-12 PROCEDURE — 83036 HEMOGLOBIN GLYCOSYLATED A1C: CPT

## 2025-03-12 PROCEDURE — 82570 ASSAY OF URINE CREATININE: CPT

## 2025-03-12 PROCEDURE — 36415 COLL VENOUS BLD VENIPUNCTURE: CPT

## 2025-03-12 PROCEDURE — 81001 URINALYSIS AUTO W/SCOPE: CPT

## 2025-03-12 PROCEDURE — 85027 COMPLETE CBC AUTOMATED: CPT

## 2025-03-13 LAB
CREAT UR-MCNC: 140 MG/DL
MICROALBUMIN UR-MCNC: 38.6 MG/L
MICROALBUMIN/CREAT UR: 27.57 MG/G CR (ref 0–17)

## 2025-03-15 LAB
ALBUMIN SERPL BCG-MCNC: 4.3 G/DL (ref 3.5–5.2)
ALP SERPL-CCNC: 96 U/L (ref 40–150)
ALT SERPL W P-5'-P-CCNC: 38 U/L (ref 0–70)
ANION GAP SERPL CALCULATED.3IONS-SCNC: 13 MMOL/L (ref 7–15)
AST SERPL W P-5'-P-CCNC: 33 U/L (ref 0–45)
BILIRUB SERPL-MCNC: 0.4 MG/DL
BUN SERPL-MCNC: 27 MG/DL (ref 8–23)
CALCIUM SERPL-MCNC: 9.8 MG/DL (ref 8.8–10.4)
CHLORIDE SERPL-SCNC: 102 MMOL/L (ref 98–107)
CREAT SERPL-MCNC: 1.24 MG/DL (ref 0.67–1.17)
EGFRCR SERPLBLD CKD-EPI 2021: 57 ML/MIN/1.73M2
FASTING STATUS PATIENT QL REPORTED: NO
GLUCOSE SERPL-MCNC: 105 MG/DL (ref 70–99)
HCO3 SERPL-SCNC: 21 MMOL/L (ref 22–29)
POTASSIUM SERPL-SCNC: 4.8 MMOL/L (ref 3.4–5.3)
PROT SERPL-MCNC: 7.4 G/DL (ref 6.4–8.3)
SODIUM SERPL-SCNC: 136 MMOL/L (ref 135–145)

## 2025-04-25 ENCOUNTER — TELEPHONE (OUTPATIENT)
Dept: FAMILY MEDICINE | Facility: CLINIC | Age: 85
End: 2025-04-25
Payer: COMMERCIAL

## 2025-04-25 NOTE — TELEPHONE ENCOUNTER
Prior Authorization Retail Medication Request    Medication/Dose:   Diagnosis and ICD code (if different than what is on RX):  empagliflozin (JARDIANCE) 10 MG TABS tablet   New/renewal/insurance change PA/secondary ins. PA:  Previously Tried and Failed:    Rationale:      Insurance   Primary:   Insurance ID:      Secondary (if applicable):  Insurance ID:      Pharmacy Information (if different than what is on RX)  Name:    Phone:    Fax:    Clinic Information  Preferred routing pool for dept communication:

## 2025-04-29 NOTE — TELEPHONE ENCOUNTER
Retail Pharmacy Prior Authorization Team   Phone: [phone]    PA Initiation    Medication: JARDIANCE 10 MG PO TABS  Insurance Company: [employer] ([facility]) - Phone [phone] Fax [phone]  Pharmacy Filling the Rx: [id number] - [place], [place] - [address]  Filling Pharmacy Phone: [phone]  Filling Pharmacy Fax:    Start Date: 4/29/2025    TARIQ CASTRO (Key: [id number])

## 2025-04-29 NOTE — TELEPHONE ENCOUNTER
PRIOR AUTHORIZATION DENIED    PATIENT DOES NOT HAVE PART D COVERAGE AT THIS TIME - PT PAYS WITH DISCOUNT CARD     Medication: JARDIANCE 10 MG PO TABS  Insurance Company: [employer] ([facility]) - Phone [phone] Fax [phone]  Denial Date: 4/29/2025  Denial Reason(s): PLAN DOES NOT COVER OUTPATIENT RX DRUGS THAT MAY BE COVERED UNDER PART D      Appeal Information: N/A  Patient Notified: NO

## 2025-08-11 ENCOUNTER — TELEPHONE (OUTPATIENT)
Dept: FAMILY MEDICINE | Facility: CLINIC | Age: 85
End: 2025-08-11
Payer: COMMERCIAL

## 2025-08-11 DIAGNOSIS — E11.9 TYPE 2 DIABETES MELLITUS WITHOUT COMPLICATION, WITHOUT LONG-TERM CURRENT USE OF INSULIN (H): ICD-10-CM
